# Patient Record
Sex: FEMALE | Race: BLACK OR AFRICAN AMERICAN | NOT HISPANIC OR LATINO | URBAN - METROPOLITAN AREA
[De-identification: names, ages, dates, MRNs, and addresses within clinical notes are randomized per-mention and may not be internally consistent; named-entity substitution may affect disease eponyms.]

---

## 2017-01-21 ENCOUNTER — EMERGENCY (EMERGENCY)
Facility: HOSPITAL | Age: 22
LOS: 0 days | Discharge: HOME | End: 2017-01-21
Admitting: EMERGENCY MEDICINE
Payer: MEDICAID
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
## 2017-01-21 DIAGNOSIS — R51 HEADACHE: ICD-10-CM

## 2017-01-21 DIAGNOSIS — M54.2 CERVICALGIA: ICD-10-CM

## 2017-01-21 DIAGNOSIS — S16.1XXA STRAIN OF MUSCLE, FASCIA AND TENDON AT NECK LEVEL, INITIAL ENCOUNTER: ICD-10-CM

## 2017-01-21 DIAGNOSIS — S19.80XA OTHER SPECIFIED INJURIES OF UNSPECIFIED PART OF NECK, INITIAL ENCOUNTER: ICD-10-CM
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
## 2017-01-21 DIAGNOSIS — S19.89XA OTHER SPECIFIED INJURIES OF OTHER SPECIFIED PART OF NECK, INITIAL ENCOUNTER: ICD-10-CM

## 2017-01-21 PROCEDURE — 99285 EMERGENCY DEPT VISIT HI MDM: CPT

## 2017-01-21 PROCEDURE — 73110 X-RAY EXAM OF WRIST: CPT | Mod: 26,LT

## 2017-01-21 PROCEDURE — 70450 CT HEAD/BRAIN W/O DYE: CPT | Mod: 26

## 2017-01-21 PROCEDURE — 72125 CT NECK SPINE W/O DYE: CPT | Mod: 26

## 2017-09-12 ENCOUNTER — EMERGENCY (EMERGENCY)
Facility: HOSPITAL | Age: 22
LOS: 0 days | Discharge: HOME | End: 2017-09-12
Attending: EMERGENCY MEDICINE | Admitting: EMERGENCY MEDICINE
Payer: MEDICAID

## 2017-09-12 VITALS
RESPIRATION RATE: 16 BRPM | SYSTOLIC BLOOD PRESSURE: 110 MMHG | HEART RATE: 66 BPM | DIASTOLIC BLOOD PRESSURE: 69 MMHG | OXYGEN SATURATION: 100 % | TEMPERATURE: 98 F

## 2017-09-12 VITALS — HEIGHT: 63 IN | WEIGHT: 143.08 LBS

## 2017-09-12 DIAGNOSIS — R10.9 UNSPECIFIED ABDOMINAL PAIN: ICD-10-CM

## 2017-09-12 DIAGNOSIS — O26.91 PREGNANCY RELATED CONDITIONS, UNSPECIFIED, FIRST TRIMESTER: ICD-10-CM

## 2017-09-12 LAB
ANION GAP SERPL CALC-SCNC: 6 MMOL/L — SIGNIFICANT CHANGE UP (ref 5–17)
APPEARANCE UR: CLEAR — SIGNIFICANT CHANGE UP
APTT BLD: 32.5 SEC — SIGNIFICANT CHANGE UP (ref 27.5–37.4)
BACTERIA # UR AUTO: (no result)
BASOPHILS # BLD AUTO: 0.2 K/UL — SIGNIFICANT CHANGE UP (ref 0–0.2)
BASOPHILS NFR BLD AUTO: 1 % — SIGNIFICANT CHANGE UP (ref 0–2)
BILIRUB UR-MCNC: NEGATIVE — SIGNIFICANT CHANGE UP
BLD GP AB SCN SERPL QL: SIGNIFICANT CHANGE UP
BUN SERPL-MCNC: 14 MG/DL — SIGNIFICANT CHANGE UP (ref 7–23)
CALCIUM SERPL-MCNC: 8.8 MG/DL — SIGNIFICANT CHANGE UP (ref 8.5–10.1)
CHLORIDE SERPL-SCNC: 105 MMOL/L — SIGNIFICANT CHANGE UP (ref 96–108)
CO2 SERPL-SCNC: 27 MMOL/L — SIGNIFICANT CHANGE UP (ref 22–31)
COLOR SPEC: YELLOW — SIGNIFICANT CHANGE UP
CREAT SERPL-MCNC: 0.63 MG/DL — SIGNIFICANT CHANGE UP (ref 0.5–1.3)
DIFF PNL FLD: (no result)
EOSINOPHIL # BLD AUTO: 0.2 K/UL — SIGNIFICANT CHANGE UP (ref 0–0.5)
EOSINOPHIL NFR BLD AUTO: 1.3 % — SIGNIFICANT CHANGE UP (ref 0–6)
EPI CELLS # UR: SIGNIFICANT CHANGE UP
GLUCOSE SERPL-MCNC: 87 MG/DL — SIGNIFICANT CHANGE UP (ref 70–99)
GLUCOSE UR QL: NEGATIVE MG/DL — SIGNIFICANT CHANGE UP
HCG SERPL-ACNC: 1387 MIU/ML — SIGNIFICANT CHANGE UP
HCT VFR BLD CALC: 35.3 % — SIGNIFICANT CHANGE UP (ref 34.5–45)
HGB BLD-MCNC: 11.9 G/DL — SIGNIFICANT CHANGE UP (ref 11.5–15.5)
HIV 1 & 2 AB SERPL IA.RAPID: SIGNIFICANT CHANGE UP
INR BLD: 1.06 RATIO — SIGNIFICANT CHANGE UP (ref 0.88–1.16)
KETONES UR-MCNC: NEGATIVE — SIGNIFICANT CHANGE UP
LEUKOCYTE ESTERASE UR-ACNC: NEGATIVE — SIGNIFICANT CHANGE UP
LYMPHOCYTES # BLD AUTO: 21.4 % — SIGNIFICANT CHANGE UP (ref 13–44)
LYMPHOCYTES # BLD AUTO: 3.7 K/UL — HIGH (ref 1–3.3)
MCHC RBC-ENTMCNC: 25.6 PG — LOW (ref 27–34)
MCHC RBC-ENTMCNC: 33.6 GM/DL — SIGNIFICANT CHANGE UP (ref 32–36)
MCV RBC AUTO: 76.2 FL — LOW (ref 80–100)
MONOCYTES # BLD AUTO: 0.9 K/UL — SIGNIFICANT CHANGE UP (ref 0–0.9)
MONOCYTES NFR BLD AUTO: 5.4 % — SIGNIFICANT CHANGE UP (ref 2–14)
NEUTROPHILS # BLD AUTO: 12.1 K/UL — HIGH (ref 1.8–7.4)
NEUTROPHILS NFR BLD AUTO: 70.9 % — SIGNIFICANT CHANGE UP (ref 43–77)
NITRITE UR-MCNC: NEGATIVE — SIGNIFICANT CHANGE UP
PH UR: 6 — SIGNIFICANT CHANGE UP (ref 5–8)
PLATELET # BLD AUTO: 288 K/UL — SIGNIFICANT CHANGE UP (ref 150–400)
POTASSIUM SERPL-MCNC: 3.7 MMOL/L — SIGNIFICANT CHANGE UP (ref 3.5–5.3)
POTASSIUM SERPL-SCNC: 3.7 MMOL/L — SIGNIFICANT CHANGE UP (ref 3.5–5.3)
PROT UR-MCNC: NEGATIVE MG/DL — SIGNIFICANT CHANGE UP
PROTHROM AB SERPL-ACNC: 11.5 SEC — SIGNIFICANT CHANGE UP (ref 9.8–12.7)
RBC # BLD: 4.64 M/UL — SIGNIFICANT CHANGE UP (ref 3.8–5.2)
RBC # FLD: 17.7 % — HIGH (ref 10.3–14.5)
RBC CASTS # UR COMP ASSIST: SIGNIFICANT CHANGE UP /HPF (ref 0–4)
SODIUM SERPL-SCNC: 138 MMOL/L — SIGNIFICANT CHANGE UP (ref 135–145)
SP GR SPEC: 1.02 — SIGNIFICANT CHANGE UP (ref 1.01–1.02)
TYPE + AB SCN PNL BLD: SIGNIFICANT CHANGE UP
UROBILINOGEN FLD QL: NEGATIVE MG/DL — SIGNIFICANT CHANGE UP
WBC # BLD: 17.1 K/UL — HIGH (ref 3.8–10.5)
WBC # FLD AUTO: 17.1 K/UL — HIGH (ref 3.8–10.5)
WBC UR QL: SIGNIFICANT CHANGE UP

## 2017-09-12 PROCEDURE — 76830 TRANSVAGINAL US NON-OB: CPT | Mod: 26

## 2017-09-12 PROCEDURE — 99284 EMERGENCY DEPT VISIT MOD MDM: CPT

## 2017-09-12 RX ORDER — SODIUM CHLORIDE 9 MG/ML
1000 INJECTION INTRAMUSCULAR; INTRAVENOUS; SUBCUTANEOUS ONCE
Qty: 0 | Refills: 0 | Status: COMPLETED | OUTPATIENT
Start: 2017-09-12 | End: 2017-09-12

## 2017-09-12 RX ADMIN — SODIUM CHLORIDE 1000 MILLILITER(S): 9 INJECTION INTRAMUSCULAR; INTRAVENOUS; SUBCUTANEOUS at 21:15

## 2017-09-12 NOTE — ED ADULT NURSE NOTE - CHIEF COMPLAINT QUOTE
cramping with ABD pain pt states her LMP 8/7/17 and believes she may be pregnant, took a home test that show + pregnancy

## 2017-09-12 NOTE — ED STATDOCS - OBJECTIVE STATEMENT
22 y/o pregnant female with no PMHx presents to the ED c/o abd pain. Pt states that she has been cramping.  LNMP on .  A1.

## 2017-09-12 NOTE — ED STATDOCS - ATTENDING CONTRIBUTION TO CARE
Attending Contribution to Care: I, Tabitha Treviño, performed the initial face to face bedside interview with this patient regarding history of present illness, review of symptoms and relevant past medical, social and family history.  I completed an independent physical examination.  I was the initial provider who evaluated this patient and the history, physical, and MDM reflect this intial assessment. I have signed out the follow up of any pending tests after the original (i.e. labs, radiological studies) to the ACP with instructions to review any with instructions to review any concerning findings to me prior to discharge.  I have communicated the patient’s plan of care and disposition with the ACP.

## 2017-09-12 NOTE — ED ADULT NURSE NOTE - OBJECTIVE STATEMENT
cramping with ABD pain pt states her LMP 8/7/17 and believes she may be pregnant, took a home test that show + pregnancy  pt denies vaginal bleeding

## 2018-01-11 ENCOUNTER — EMERGENCY (EMERGENCY)
Facility: HOSPITAL | Age: 23
LOS: 1 days | Discharge: HOME | End: 2018-01-11
Attending: EMERGENCY MEDICINE | Admitting: EMERGENCY MEDICINE
Payer: MEDICAID

## 2018-01-11 VITALS
SYSTOLIC BLOOD PRESSURE: 108 MMHG | RESPIRATION RATE: 15 BRPM | HEART RATE: 71 BPM | OXYGEN SATURATION: 99 % | TEMPERATURE: 98 F | DIASTOLIC BLOOD PRESSURE: 70 MMHG

## 2018-01-11 VITALS
SYSTOLIC BLOOD PRESSURE: 126 MMHG | HEART RATE: 81 BPM | RESPIRATION RATE: 16 BRPM | HEIGHT: 61 IN | TEMPERATURE: 98 F | WEIGHT: 145.95 LBS | OXYGEN SATURATION: 100 % | DIASTOLIC BLOOD PRESSURE: 77 MMHG

## 2018-01-11 DIAGNOSIS — Z98.891 HISTORY OF UTERINE SCAR FROM PREVIOUS SURGERY: Chronic | ICD-10-CM

## 2018-01-11 PROCEDURE — 99283 EMERGENCY DEPT VISIT LOW MDM: CPT

## 2018-01-11 PROCEDURE — 73110 X-RAY EXAM OF WRIST: CPT

## 2018-01-11 PROCEDURE — 73130 X-RAY EXAM OF HAND: CPT | Mod: 26,RT

## 2018-01-11 PROCEDURE — 73110 X-RAY EXAM OF WRIST: CPT | Mod: 26,RT

## 2018-01-11 PROCEDURE — 73130 X-RAY EXAM OF HAND: CPT

## 2018-01-11 PROCEDURE — 99284 EMERGENCY DEPT VISIT MOD MDM: CPT | Mod: 25

## 2018-01-11 RX ORDER — ACETAMINOPHEN 500 MG
650 TABLET ORAL ONCE
Qty: 0 | Refills: 0 | Status: COMPLETED | OUTPATIENT
Start: 2018-01-11 | End: 2018-01-11

## 2018-01-11 RX ADMIN — Medication 650 MILLIGRAM(S): at 17:17

## 2018-01-11 RX ADMIN — Medication 650 MILLIGRAM(S): at 18:44

## 2018-01-11 NOTE — ED ADULT NURSE NOTE - OBJECTIVE STATEMENT
22 yr old female c/o right hand and wrist pain s/p fall at work today. Pt 3 and 4 nails on right hand broke; minimal bleeding at site. Pt states she fell walking up the stairs. Pt states she is 23 weeks pregnant. Pt states she put her hands down to break fall and did not hit head or abdomen. Pt denies vision changes, SOB, chest pain, spotting, abdominal pain.

## 2018-01-11 NOTE — ED PROVIDER NOTE - SKIN, MLM
Skin normal color for race, warm, dry. Partially avulsed 3rd and 4th right finger nails, no active bleeding.

## 2018-01-11 NOTE — ED PROVIDER NOTE - OBJECTIVE STATEMENT
23 y/o F pt w/ no significant PMHx presents to the ED c/o right hand pain/injury s/p mechanical fall today. Pt states she tripped and fell outside of work, used her hands to try to stop her fall... states her right wrist and right 3rd and 4th fingers hyperextended upon impact. Pt also states she is 23 weeks pregnant. Pt denies numbness, tingling, LOC or any other complaints at this time.

## 2018-01-11 NOTE — ED PROVIDER NOTE - MEDICAL DECISION MAKING DETAILS
evaluate the severity of finger injury corollate with Physical exam and imaging study treat accordingly.

## 2018-01-11 NOTE — ED ADULT NURSE NOTE - CHPI ED SYMPTOMS NEG
no vomiting/no abrasion/no confusion/no loss of consciousness/no fever/no tingling/no numbness/no weakness

## 2018-01-11 NOTE — ED ADULT NURSE REASSESSMENT NOTE - NS ED NURSE REASSESS COMMENT FT1
Pt states when she fell she slightly hit abdomen on stair but very light ly; Dr Valentino aware. Doppler used to locate fetal heartbeat; fetal heartrate 160BPM. Pt states sensation of baby movement. No spotting or discharge noted.

## 2018-10-22 ENCOUNTER — TRANSCRIPTION ENCOUNTER (OUTPATIENT)
Age: 23
End: 2018-10-22

## 2018-11-20 ENCOUNTER — TRANSCRIPTION ENCOUNTER (OUTPATIENT)
Age: 23
End: 2018-11-20

## 2018-11-29 ENCOUNTER — EMERGENCY (EMERGENCY)
Facility: HOSPITAL | Age: 23
LOS: 0 days | Discharge: HOME | End: 2018-11-29
Attending: EMERGENCY MEDICINE
Payer: MEDICAID

## 2018-11-29 VITALS
RESPIRATION RATE: 18 BRPM | OXYGEN SATURATION: 100 % | SYSTOLIC BLOOD PRESSURE: 123 MMHG | TEMPERATURE: 98 F | DIASTOLIC BLOOD PRESSURE: 71 MMHG | HEART RATE: 79 BPM

## 2018-11-29 VITALS — WEIGHT: 143.08 LBS

## 2018-11-29 DIAGNOSIS — Z3A.19 19 WEEKS GESTATION OF PREGNANCY: ICD-10-CM

## 2018-11-29 DIAGNOSIS — O26.892 OTHER SPECIFIED PREGNANCY RELATED CONDITIONS, SECOND TRIMESTER: ICD-10-CM

## 2018-11-29 DIAGNOSIS — Z98.891 HISTORY OF UTERINE SCAR FROM PREVIOUS SURGERY: Chronic | ICD-10-CM

## 2018-11-29 DIAGNOSIS — R11.10 VOMITING, UNSPECIFIED: ICD-10-CM

## 2018-11-29 DIAGNOSIS — R55 SYNCOPE AND COLLAPSE: ICD-10-CM

## 2018-11-29 LAB
ALBUMIN SERPL ELPH-MCNC: 2.8 G/DL — LOW (ref 3.3–5)
ALP SERPL-CCNC: 65 U/L — SIGNIFICANT CHANGE UP (ref 40–120)
ALT FLD-CCNC: 16 U/L — SIGNIFICANT CHANGE UP (ref 12–78)
ANION GAP SERPL CALC-SCNC: 11 MMOL/L — SIGNIFICANT CHANGE UP (ref 5–17)
APPEARANCE UR: CLEAR — SIGNIFICANT CHANGE UP
APTT BLD: 28.1 SEC — SIGNIFICANT CHANGE UP (ref 27.5–36.3)
AST SERPL-CCNC: 14 U/L — LOW (ref 15–37)
BASOPHILS # BLD AUTO: 0.03 K/UL — SIGNIFICANT CHANGE UP (ref 0–0.2)
BASOPHILS NFR BLD AUTO: 0.3 % — SIGNIFICANT CHANGE UP (ref 0–2)
BILIRUB SERPL-MCNC: 0.1 MG/DL — LOW (ref 0.2–1.2)
BILIRUB UR-MCNC: NEGATIVE — SIGNIFICANT CHANGE UP
BUN SERPL-MCNC: 7 MG/DL — SIGNIFICANT CHANGE UP (ref 7–23)
CALCIUM SERPL-MCNC: 8.7 MG/DL — SIGNIFICANT CHANGE UP (ref 8.5–10.1)
CHLORIDE SERPL-SCNC: 105 MMOL/L — SIGNIFICANT CHANGE UP (ref 96–108)
CO2 SERPL-SCNC: 23 MMOL/L — SIGNIFICANT CHANGE UP (ref 22–31)
COLOR SPEC: YELLOW — SIGNIFICANT CHANGE UP
CREAT SERPL-MCNC: 0.48 MG/DL — LOW (ref 0.5–1.3)
DIFF PNL FLD: NEGATIVE — SIGNIFICANT CHANGE UP
EOSINOPHIL # BLD AUTO: 0.1 K/UL — SIGNIFICANT CHANGE UP (ref 0–0.5)
EOSINOPHIL NFR BLD AUTO: 1 % — SIGNIFICANT CHANGE UP (ref 0–6)
GLUCOSE SERPL-MCNC: 74 MG/DL — SIGNIFICANT CHANGE UP (ref 70–99)
GLUCOSE UR QL: NEGATIVE MG/DL — SIGNIFICANT CHANGE UP
HCG SERPL-ACNC: HIGH MIU/ML
HCT VFR BLD CALC: 30.4 % — LOW (ref 34.5–45)
HGB BLD-MCNC: 9.5 G/DL — LOW (ref 11.5–15.5)
IMM GRANULOCYTES NFR BLD AUTO: 0.3 % — SIGNIFICANT CHANGE UP (ref 0–1.5)
INR BLD: 1.02 RATIO — SIGNIFICANT CHANGE UP (ref 0.88–1.16)
KETONES UR-MCNC: ABNORMAL
LEUKOCYTE ESTERASE UR-ACNC: ABNORMAL
LYMPHOCYTES # BLD AUTO: 2.22 K/UL — SIGNIFICANT CHANGE UP (ref 1–3.3)
LYMPHOCYTES # BLD AUTO: 22.7 % — SIGNIFICANT CHANGE UP (ref 13–44)
MCHC RBC-ENTMCNC: 23.3 PG — LOW (ref 27–34)
MCHC RBC-ENTMCNC: 31.3 GM/DL — LOW (ref 32–36)
MCV RBC AUTO: 74.5 FL — LOW (ref 80–100)
MONOCYTES # BLD AUTO: 0.61 K/UL — SIGNIFICANT CHANGE UP (ref 0–0.9)
MONOCYTES NFR BLD AUTO: 6.2 % — SIGNIFICANT CHANGE UP (ref 2–14)
NEUTROPHILS # BLD AUTO: 6.79 K/UL — SIGNIFICANT CHANGE UP (ref 1.8–7.4)
NEUTROPHILS NFR BLD AUTO: 69.5 % — SIGNIFICANT CHANGE UP (ref 43–77)
NITRITE UR-MCNC: NEGATIVE — SIGNIFICANT CHANGE UP
PH UR: 7 — SIGNIFICANT CHANGE UP (ref 5–8)
PLATELET # BLD AUTO: 288 K/UL — SIGNIFICANT CHANGE UP (ref 150–400)
POTASSIUM SERPL-MCNC: 3.5 MMOL/L — SIGNIFICANT CHANGE UP (ref 3.5–5.3)
POTASSIUM SERPL-SCNC: 3.5 MMOL/L — SIGNIFICANT CHANGE UP (ref 3.5–5.3)
PROT SERPL-MCNC: 6.8 GM/DL — SIGNIFICANT CHANGE UP (ref 6–8.3)
PROT UR-MCNC: NEGATIVE MG/DL — SIGNIFICANT CHANGE UP
PROTHROM AB SERPL-ACNC: 11.3 SEC — SIGNIFICANT CHANGE UP (ref 10–12.9)
RBC # BLD: 4.08 M/UL — SIGNIFICANT CHANGE UP (ref 3.8–5.2)
RBC # FLD: 20.4 % — HIGH (ref 10.3–14.5)
SODIUM SERPL-SCNC: 139 MMOL/L — SIGNIFICANT CHANGE UP (ref 135–145)
SP GR SPEC: 1 — LOW (ref 1.01–1.02)
TROPONIN I SERPL-MCNC: <0.015 NG/ML — SIGNIFICANT CHANGE UP (ref 0.01–0.04)
TYPE + AB SCN PNL BLD: SIGNIFICANT CHANGE UP
UROBILINOGEN FLD QL: NEGATIVE MG/DL — SIGNIFICANT CHANGE UP
WBC # BLD: 9.78 K/UL — SIGNIFICANT CHANGE UP (ref 3.8–10.5)
WBC # FLD AUTO: 9.78 K/UL — SIGNIFICANT CHANGE UP (ref 3.8–10.5)

## 2018-11-29 PROCEDURE — 93010 ELECTROCARDIOGRAM REPORT: CPT

## 2018-11-29 PROCEDURE — 76805 OB US >/= 14 WKS SNGL FETUS: CPT | Mod: 26

## 2018-11-29 PROCEDURE — 99285 EMERGENCY DEPT VISIT HI MDM: CPT

## 2018-11-29 PROCEDURE — 99283 EMERGENCY DEPT VISIT LOW MDM: CPT

## 2018-11-29 RX ORDER — SODIUM CHLORIDE 9 MG/ML
1000 INJECTION INTRAMUSCULAR; INTRAVENOUS; SUBCUTANEOUS ONCE
Qty: 0 | Refills: 0 | Status: COMPLETED | OUTPATIENT
Start: 2018-11-29 | End: 2018-11-29

## 2018-11-29 RX ADMIN — SODIUM CHLORIDE 1000 MILLILITER(S): 9 INJECTION INTRAMUSCULAR; INTRAVENOUS; SUBCUTANEOUS at 16:05

## 2018-11-29 RX ADMIN — SODIUM CHLORIDE 1000 MILLILITER(S): 9 INJECTION INTRAMUSCULAR; INTRAVENOUS; SUBCUTANEOUS at 17:05

## 2018-11-29 NOTE — ED STATDOCS - OBJECTIVE STATEMENT
22 y/o female 16 weeks pregnant with no significant PMHx presents to the ED s/p syncope. Pt notes she was vomiting over the toilet at school and does not remember anything after that. Unknown if pt hit her head. +HA. Denies vaginal bleeding, discharge. LNMP: August 2018. NKDA. No EtOH use. Former smoker.

## 2018-11-29 NOTE — ED ADULT NURSE NOTE - OBJECTIVE STATEMENT
C/O syncope episode today while at school. Patient reports she was vomiting when passed out. Does not remember anything else. Per patient woke up sitting on the floor. Reports nausea, dizziness, headache. 3rd pregnancy, 16 weeks into pregnancy. Denies med hx.

## 2018-11-29 NOTE — ED ADULT NURSE NOTE - NSIMPLEMENTINTERV_GEN_ALL_ED
Implemented All Fall with Harm Risk Interventions:  [place] to call system. Call bell, personal items and telephone within reach. Instruct patient to call for assistance. Room bathroom lighting operational. Non-slip footwear when patient is off stretcher. Physically safe environment: no spills, clutter or unnecessary equipment. Stretcher in lowest position, wheels locked, appropriate side rails in place. Provide visual cue, wrist band, yellow gown, etc. Monitor gait and stability. Monitor for mental status changes and reorient to person, place, and time. Review medications for side effects contributing to fall risk. Reinforce activity limits and safety measures with patient and family. Provide visual clues: red socks.

## 2018-11-29 NOTE — ED STATDOCS - MEDICAL DECISION MAKING DETAILS
presents with syncopal episode while vomiting at school. Pt had 2 miscarriages. Plan: cardiac monitor, labs, hydration and sonogram as she has not had a sonogram yet.

## 2018-11-29 NOTE — CONSULT NOTE ADULT - ASSESSMENT
Patient is a 23y old  @ 19w3d +/- 7days per sono in ED presented after syncopal episode while vomiting in school.  - Patient reported normal FM, no vaginal bleeding or fluid loss  - will need cardio and neuro workup for syncopal episode  - discussed with patient regarding options for pregnancy termination and limited options due to her being about 20 weeks pregnant. Patient understands that if she chooses to proceed with pregnancy, she will need continuous prenatal care. Patient stated that she will probably go back to [facility] where her midwife is.  - all other management and plan per ED  - discussed with ER PA, and patient regarding plan, verbalized understanding.

## 2018-11-29 NOTE — ED ADULT TRIAGE NOTE - CHIEF COMPLAINT QUOTE
pt presents to ED approx 20 weeks pregnant with her 3rd pregnancy pt had a syncopal episode while vomiting approx 1 hour ago

## 2018-11-29 NOTE — CONSULT NOTE ADULT - SUBJECTIVE AND OBJECTIVE BOX
FELICIA ZHOU  23y  Female [id number]    Patient is a 23y old  @ 19w3d +/- 7days per sono in ED presented after syncopal episode while vomiting in school. Patient stated that she felt nauseous after eating a turkey sandwich today. Patient reports hx of anxiety and stated that she has not taken any medications for the last 2 years, but reports hx of syncopal episodes when she has anxiety attacks. She also reports that she has hx of chronic anemia, was never told the reason, but reports family hx of sickle cell anemia. Patient currently not receiving any prenatal care, [facility] since she found out she is pregnant, is not taking any prenatal vitamins. Patient also stated that she is considering terminating the pregnancy, but has not yet found any specific places for termination after 20 weeks. Patient was told by friends and family that she can go somewhere in [place] if she chooses to terminate. Patient reported 2 previous c/s at full term due to failure to descend (last c/s was 6 months ago). Currently feeling better.     REVIEW OF SYSTEMS:  CONSTITUTIONAL: No weakness, fevers or chills  RESPIRATORY: No cough, wheezing, hemoptysis; No shortness of breath  CARDIOVASCULAR: No chest pain or palpitations  GASTROINTESTINAL: reported vomiting, No abdominal or epigastric pain. No nausea, No diarrhea o  GENITOURINARY: No dysuria, frequency or hematuria  NEUROLOGICAL: reported dizziness which improved, No numbness or weakness      MEDICATIONS  (STANDING):    MEDICATIONS  (PRN):      Allergies    No Known Drug Allergies  Originally Entered as [Unknown] reaction to [Bee/Wasp Stings] (Unknown)    Intolerances        PAST MEDICAL & SURGICAL HISTORY:  chronic anemia  anxiety   H/O:       OB/GYN HISTORY:    LMP: late august   (currently no prenatal care)   2x c/s at full term for failure to descend                                             FAMILY HISTORY:  sickle cell     SOCIAL HISTORY:    Vital Signs Last 24 Hrs  T(C): 36.6 (2018 15:14), Max: 36.6 (2018 15:14)  T(F): 97.9 (2018 15:14), Max: 97.9 (2018 15:14)  HR: 79 (2018 15:14) (79 - 79)  BP: 123/71 (2018 15:14) (123/71 - 123/71)  BP(mean): --  RR: 18 (2018 15:14) (18 - 18)  SpO2: 100% (2018 15:14) (100% - 100%)      PHYSICAL EXAM:  Constitutional: NAD, awake and alert, appears comfortable  Respiratory: Breath sounds are clear bilaterally, No wheezing, rales or rhonchi  Cardiovascular: S1 and S2, regular rate and rhythm, no Murmurs, gallops or rubs  Gastrointestinal: Bowel Sounds present, soft, nontender, nondistended, no guarding, no rebound  Extremities: No peripheral edema  Neurological: A/O x 3, no focal deficits      LABS:  Pregnancy Test: positive                        9.5    9.78  )-----------( 288      ( 2018 16:02 )             30.4         139  |  105  |  7   ----------------------------<  74  3.5   |  23  |  0.48<L>    Ca    8.7      2018 16:02    TPro  6.8  /  Alb  2.8<L>  /  TBili  0.1<L>  /  DBili  x   /  AST  14<L>  /  ALT  16  /  AlkPhos  65      I&O's Detail    PT/INR - ( 2018 16:02 )   PT: 11.3 sec;   INR: 1.02 ratio         PTT - ( 2018 16:02 )  PTT:28.1 sec  Urinalysis Basic - ( 2018 16:02 )    Color: Yellow / Appearance: Clear / S.005 / pH: x  Gluc: x / Ketone: Trace  / Bili: Negative / Urobili: Negative mg/dL   Blood: x / Protein: Negative mg/dL / Nitrite: Negative   Leuk Esterase: Trace / RBC: 0-5 /HPF / WBC 3-5   Sq Epi: x / Non Sq Epi: Few / Bacteria: Occasional        RADIOLOGY & ADDITIONAL STUDIES:  < from: US Echo OB >=14 Weeks, First Gestation (18 @ 16:21) >  EXAM:  US OB GRT THAN 14 WKS 1ST GEST                            PROCEDURE DATE:  2018          INTERPRETATION:  US OB GRT THAN 14 WKS 1ST GEST    HISTORY: 20 weeks pregnant, syncope. Pregnancy evaluation.    Transabdominal pelvic ultrasound:    A single live Intrauterine gestation is present in cephalic presentation.   The placenta is posterior and fundal without previa. Amniotic fluid   volume is within normal limits with the JEREMY measuring 15.7 cm with the   largest fluid pocket measuring 5 cm.     Fetal motion is seen in real-time and the fetal heart rate measures 142   bpm.    Evaluation of fetal anatomy is limited on this examination. The fetal   stomach and urinary bladder are normally distended. The fetal kidneys are   normal in size and position without hydronephrosis.     Measurements are as follows:    BPD: 4.59 cm corresponding to 19 weeks 6 days.  HC: 15.8 cm corresponding to 18 weeks 5 days.  AC: 14.97 cm corresponding to 20 weeks 2 days.  FL: 3.15 cm corresponding to19 weeks 6 days.    Estimated fetal weight: 318 g +/- 48 g. ( 0 lb 11 oz +/- 2 oz)    Estimated fetal weight percent: 97%    IMPRESSION:  19 weeks and 3 days +/- 7 days by this ultrasound corresponding to an SERG   of 2019.    FHR = 142 bpm    JEREMY = 15.7 cm    BRITT GAING   This document has been electronically signed. 2018  4:52PM

## 2018-11-29 NOTE — ED STATDOCS - ATTENDING CONTRIBUTION TO CARE
I, Dorita Martinez MD, personally saw the patient with ACP.  I have personally performed a face to face diagnostic evaluation on this patient.  I have reviewed the ACP note and agree with the history, exam, and plan of care, except as noted.

## 2018-11-29 NOTE — CONSULT NOTE ADULT - ATTENDING COMMENTS
24yo  @ 19w3d presented to ER  for syncopal episode, patient had turkey sandwich, vomiting x1, anxiety, palpitations.  h/o anxiety, Depression, used Topamax and zoloft in the past   2 previous  c-sections  feeling better now, positive fetal movements, no leakage of fluid, no vaginal bleeding  VS- wnl  A/P- Gravid, non tender, FHR documented by US  No vaginal bleeding  start prenatal vitamin.  Medical management per ER attending.  follow up with cardiology and Neurology for complete evaluation of syncopal episode. patient informed, verbalised understanding  ER PA Ms Bragg informed of the plan

## 2018-11-29 NOTE — ED STATDOCS - PROGRESS NOTE DETAILS
Patient seen and evaluated with ED attending at intake.  Will follow up on orders and continue to monitor patient -Deacon Bragg PA-C Reviewed lab and sono results with patient.  She admits to chronic anemia and that she has not yet been on prenatal vitamins.  Case was d/w OB who will be down to evaluate -Deacon Bragg PA-C OB Dr. Bender saw and evaluated patient.  She recommends patient establish care at an OB and have close  follow up as well as outpatient appointments with both neurology and cardiology for the syncopal episode.  Will start patient on prenatal vitamins.  Strict return precautions reviewed -Deacon Bragg PA-C pt feeling better upon discharge.  read back discharge instructions

## 2018-11-30 LAB
CULTURE RESULTS: SIGNIFICANT CHANGE UP
SPECIMEN SOURCE: SIGNIFICANT CHANGE UP

## 2019-01-04 ENCOUNTER — OUTPATIENT (OUTPATIENT)
Dept: INPATIENT UNIT | Facility: HOSPITAL | Age: 24
LOS: 1 days | Discharge: HOME | End: 2019-01-04
Payer: MEDICAID

## 2019-01-04 DIAGNOSIS — Z98.891 HISTORY OF UTERINE SCAR FROM PREVIOUS SURGERY: Chronic | ICD-10-CM

## 2019-01-04 DIAGNOSIS — O47.9 FALSE LABOR, UNSPECIFIED: ICD-10-CM

## 2019-01-04 LAB
ALBUMIN SERPL ELPH-MCNC: 2.7 G/DL — LOW (ref 3.3–5)
ALP SERPL-CCNC: 76 U/L — SIGNIFICANT CHANGE UP (ref 40–120)
ALT FLD-CCNC: 14 U/L — SIGNIFICANT CHANGE UP (ref 12–78)
AMNISURE ROM (RUPTURE OF MEMBRANES): NEGATIVE — SIGNIFICANT CHANGE UP
ANION GAP SERPL CALC-SCNC: 8 MMOL/L — SIGNIFICANT CHANGE UP (ref 5–17)
AST SERPL-CCNC: 18 U/L — SIGNIFICANT CHANGE UP (ref 15–37)
BASOPHILS # BLD AUTO: 0.03 K/UL — SIGNIFICANT CHANGE UP (ref 0–0.2)
BASOPHILS NFR BLD AUTO: 0.2 % — SIGNIFICANT CHANGE UP (ref 0–2)
BILIRUB SERPL-MCNC: 0.2 MG/DL — SIGNIFICANT CHANGE UP (ref 0.2–1.2)
BUN SERPL-MCNC: 9 MG/DL — SIGNIFICANT CHANGE UP (ref 7–23)
CALCIUM SERPL-MCNC: 9 MG/DL — SIGNIFICANT CHANGE UP (ref 8.5–10.1)
CHLORIDE SERPL-SCNC: 107 MMOL/L — SIGNIFICANT CHANGE UP (ref 96–108)
CO2 SERPL-SCNC: 24 MMOL/L — SIGNIFICANT CHANGE UP (ref 22–31)
CREAT SERPL-MCNC: 0.45 MG/DL — LOW (ref 0.5–1.3)
EOSINOPHIL # BLD AUTO: 0.01 K/UL — SIGNIFICANT CHANGE UP (ref 0–0.5)
EOSINOPHIL NFR BLD AUTO: 0.1 % — SIGNIFICANT CHANGE UP (ref 0–6)
GLUCOSE SERPL-MCNC: 74 MG/DL — SIGNIFICANT CHANGE UP (ref 70–99)
HCT VFR BLD CALC: 30.6 % — LOW (ref 34.5–45)
HGB BLD-MCNC: 9.7 G/DL — LOW (ref 11.5–15.5)
IMM GRANULOCYTES NFR BLD AUTO: 0.4 % — SIGNIFICANT CHANGE UP (ref 0–1.5)
LYMPHOCYTES # BLD AUTO: 1.31 K/UL — SIGNIFICANT CHANGE UP (ref 1–3.3)
LYMPHOCYTES # BLD AUTO: 9.1 % — LOW (ref 13–44)
MCHC RBC-ENTMCNC: 23.3 PG — LOW (ref 27–34)
MCHC RBC-ENTMCNC: 31.7 GM/DL — LOW (ref 32–36)
MCV RBC AUTO: 73.6 FL — LOW (ref 80–100)
MONOCYTES # BLD AUTO: 0.69 K/UL — SIGNIFICANT CHANGE UP (ref 0–0.9)
MONOCYTES NFR BLD AUTO: 4.8 % — SIGNIFICANT CHANGE UP (ref 2–14)
NEUTROPHILS # BLD AUTO: 12.35 K/UL — HIGH (ref 1.8–7.4)
NEUTROPHILS NFR BLD AUTO: 85.4 % — HIGH (ref 43–77)
PLATELET # BLD AUTO: 304 K/UL — SIGNIFICANT CHANGE UP (ref 150–400)
POTASSIUM SERPL-MCNC: 4.3 MMOL/L — SIGNIFICANT CHANGE UP (ref 3.5–5.3)
POTASSIUM SERPL-SCNC: 4.3 MMOL/L — SIGNIFICANT CHANGE UP (ref 3.5–5.3)
PROT SERPL-MCNC: 7.2 GM/DL — SIGNIFICANT CHANGE UP (ref 6–8.3)
RBC # BLD: 4.16 M/UL — SIGNIFICANT CHANGE UP (ref 3.8–5.2)
RBC # FLD: 18 % — HIGH (ref 10.3–14.5)
SODIUM SERPL-SCNC: 139 MMOL/L — SIGNIFICANT CHANGE UP (ref 135–145)
WBC # BLD: 14.45 K/UL — HIGH (ref 3.8–10.5)
WBC # FLD AUTO: 14.45 K/UL — HIGH (ref 3.8–10.5)

## 2019-01-04 PROCEDURE — 76815 OB US LIMITED FETUS(S): CPT | Mod: 26

## 2019-01-04 RX ORDER — SODIUM CHLORIDE 9 MG/ML
1000 INJECTION, SOLUTION INTRAVENOUS ONCE
Qty: 0 | Refills: 0 | Status: DISCONTINUED | OUTPATIENT
Start: 2019-01-04 | End: 2019-01-04

## 2019-01-05 LAB
C TRACH RRNA SPEC QL NAA+PROBE: SIGNIFICANT CHANGE UP
N GONORRHOEA RRNA SPEC QL NAA+PROBE: SIGNIFICANT CHANGE UP
SPECIMEN SOURCE: SIGNIFICANT CHANGE UP

## 2019-01-06 LAB
GROUP B BETA STREP DNA (PCR): DETECTED
GROUP B BETA STREP INTERPRETATION: SIGNIFICANT CHANGE UP
SOURCE GROUP B STREP: SIGNIFICANT CHANGE UP

## 2019-02-01 ENCOUNTER — OUTPATIENT (OUTPATIENT)
Dept: OUTPATIENT SERVICES | Facility: HOSPITAL | Age: 24
LOS: 1 days | End: 2019-02-01
Payer: MEDICAID

## 2019-02-01 DIAGNOSIS — Z98.891 HISTORY OF UTERINE SCAR FROM PREVIOUS SURGERY: Chronic | ICD-10-CM

## 2019-02-01 PROCEDURE — G9001: CPT

## 2019-02-15 ENCOUNTER — EMERGENCY (EMERGENCY)
Facility: HOSPITAL | Age: 24
LOS: 0 days | Discharge: HOME | End: 2019-02-15
Attending: FAMILY MEDICINE | Admitting: FAMILY MEDICINE
Payer: MEDICAID

## 2019-02-15 VITALS
TEMPERATURE: 99 F | DIASTOLIC BLOOD PRESSURE: 68 MMHG | OXYGEN SATURATION: 100 % | HEART RATE: 97 BPM | SYSTOLIC BLOOD PRESSURE: 101 MMHG | RESPIRATION RATE: 18 BRPM

## 2019-02-15 VITALS — WEIGHT: 138.01 LBS | HEIGHT: 61 IN

## 2019-02-15 DIAGNOSIS — Z98.891 HISTORY OF UTERINE SCAR FROM PREVIOUS SURGERY: Chronic | ICD-10-CM

## 2019-02-15 DIAGNOSIS — R07.9 CHEST PAIN, UNSPECIFIED: ICD-10-CM

## 2019-02-15 DIAGNOSIS — J18.9 PNEUMONIA, UNSPECIFIED ORGANISM: ICD-10-CM

## 2019-02-15 DIAGNOSIS — Z3A.30 30 WEEKS GESTATION OF PREGNANCY: ICD-10-CM

## 2019-02-15 DIAGNOSIS — O99.513 DISEASES OF THE RESPIRATORY SYSTEM COMPLICATING PREGNANCY, THIRD TRIMESTER: ICD-10-CM

## 2019-02-15 DIAGNOSIS — O26.893 OTHER SPECIFIED PREGNANCY RELATED CONDITIONS, THIRD TRIMESTER: ICD-10-CM

## 2019-02-15 DIAGNOSIS — R06.02 SHORTNESS OF BREATH: ICD-10-CM

## 2019-02-15 PROCEDURE — 93010 ELECTROCARDIOGRAM REPORT: CPT

## 2019-02-15 PROCEDURE — 99283 EMERGENCY DEPT VISIT LOW MDM: CPT

## 2019-02-15 RX ORDER — AZITHROMYCIN 500 MG/1
500 TABLET, FILM COATED ORAL ONCE
Qty: 0 | Refills: 0 | Status: COMPLETED | OUTPATIENT
Start: 2019-02-15 | End: 2019-02-15

## 2019-02-15 RX ORDER — AZITHROMYCIN 500 MG/1
1 TABLET, FILM COATED ORAL
Qty: 4 | Refills: 0
Start: 2019-02-15 | End: 2019-02-18

## 2019-02-15 RX ADMIN — AZITHROMYCIN 500 MILLIGRAM(S): 500 TABLET, FILM COATED ORAL at 23:11

## 2019-02-15 NOTE — ED STATDOCS - CLINICAL SUMMARY MEDICAL DECISION MAKING FREE TEXT BOX
Pt 30wks pregnant w/ SOB and Chest pain associated w/ cough.  Plan:  XR, pt refusing pain medication, may need neb tx.

## 2019-02-15 NOTE — ED STATDOCS - PROGRESS NOTE DETAILS
RACH Vera:   Patient has been seen, evaluated and orders have been written by the attending in intake. Patient is stable.  I will follow up the results of orders written and I will continue to evaluate/observe the patient.   Bela Vera PA-C On re-eval, pt comfortable sitting on stretcher in [employer].  Neg Tachypnea, tachycardia.  Dry cogh intermittently.  CTA B.  Better airway movement.  Neg wheezing, crackles.  RRR.  On CXR review, ? RLL infiltrate per Dr. jimenez.  Will start PO Zithromax.  Cont for 4 more days.  Can take Tylenol for pain and Robitussin for cough.  F/U with PMD.  Bela Vera PA-C
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
## 2019-02-15 NOTE — ED ADULT NURSE REASSESSMENT NOTE - NS ED NURSE REASSESS COMMENT FT1
patient discharged home. written and verbal discharge, prescription, and followup instructions given to patient, patient verbalized back understanding.

## 2019-02-15 NOTE — ED ADULT NURSE NOTE - CHIEF COMPLAINT QUOTE
Pt presents c/o cough, sob and back pain since yesterday. No abdominal pain. Pt has positive sick contact (son.) Pt is approx 30 weeks pregnant. Pt's doctor is Dr. Farley at [facility]. Pt has a scheduled c- section. Spoke to L&D RN, pt to go up after ED evaluation. EKG being performed at this time.

## 2019-02-15 NOTE — ED STATDOCS - OBJECTIVE STATEMENT
24y/o F w/ no significant PMHx presents to the ED w/ chest pain and SOB.  Pt c/o cough which causes pain.  Symptoms are associated with sinus pressure congestion and retroorbital pain/pressure.  Pt denies N/V/D fever or chills.  No sick contacts, no recent travel outside of the country. Pt denies Tylenol when offered. LMP end of July.  Pt is currently pregnant at 30 weeks, w/o complications.  Pt has schedule  at 39wks. NKDA.  OB: Dr. Farley ([place]). 24y/o F w/ no significant PMHx presents to the ED w/ chest pain and SOB.  Pt c/o cough which causes pain.  Symptoms are associated with sinus pressure congestion and retroorbital pain/pressure.  Pt denies N/V/D fever or chills.  No sick contacts, no recent travel outside of the country. Pt denies Tylenol when offered. LMP end of July.  Pt is currently pregnant at 30 weeks, w/o complications.  Pt has schedule  at 39wks. NKDA.  OB: Dr. Farley ([place]). Nonsmoker, nondrinker no drugs.

## 2019-02-15 NOTE — ED ADULT NURSE NOTE - NSIMPLEMENTINTERV_GEN_ALL_ED
Implemented All Universal Safety Interventions:  [place] to call system. Call bell, personal items and telephone within reach. Instruct patient to call for assistance. Room bathroom lighting operational. Non-slip footwear when patient is off stretcher. Physically safe environment: no spills, clutter or unnecessary equipment. Stretcher in lowest position, wheels locked, appropriate side rails in place.

## 2019-02-15 NOTE — ED STATDOCS - CARE PLAN
Principal Discharge DX:	Pneumonia Principal Discharge DX:	CAP (community acquired pneumonia)  Secondary Diagnosis:	Pregnant and not yet delivered, third trimester

## 2019-02-16 PROCEDURE — 71046 X-RAY EXAM CHEST 2 VIEWS: CPT | Mod: 26

## 2019-02-21 DIAGNOSIS — Z71.89 OTHER SPECIFIED COUNSELING: ICD-10-CM

## 2019-07-08 NOTE — ED STATDOCS - CPE ED RESP NORM
Patient returning call. Please call her this afternoon at home [phone], she is concerned she will run out of medications before she can get in for follow up.   normal...

## 2019-10-01 ENCOUNTER — EMERGENCY (EMERGENCY)
Facility: HOSPITAL | Age: 24
LOS: 1 days | Discharge: HOME | End: 2019-10-01
Attending: EMERGENCY MEDICINE
Payer: COMMERCIAL

## 2019-10-01 VITALS
HEIGHT: 61 IN | DIASTOLIC BLOOD PRESSURE: 84 MMHG | SYSTOLIC BLOOD PRESSURE: 146 MMHG | HEART RATE: 89 BPM | OXYGEN SATURATION: 99 % | WEIGHT: 275.58 LBS | TEMPERATURE: 98 F | RESPIRATION RATE: 20 BRPM

## 2019-10-01 DIAGNOSIS — Z98.891 HISTORY OF UTERINE SCAR FROM PREVIOUS SURGERY: Chronic | ICD-10-CM

## 2019-10-01 PROCEDURE — 73110 X-RAY EXAM OF WRIST: CPT | Mod: 26,RT

## 2019-10-01 PROCEDURE — 99283 EMERGENCY DEPT VISIT LOW MDM: CPT | Mod: 25

## 2019-10-01 PROCEDURE — 73110 X-RAY EXAM OF WRIST: CPT

## 2019-10-01 PROCEDURE — 90471 IMMUNIZATION ADMIN: CPT

## 2019-10-01 PROCEDURE — 90715 TDAP VACCINE 7 YRS/> IM: CPT

## 2019-10-01 PROCEDURE — 99283 EMERGENCY DEPT VISIT LOW MDM: CPT

## 2019-10-01 RX ORDER — TETANUS TOXOID, REDUCED DIPHTHERIA TOXOID AND ACELLULAR PERTUSSIS VACCINE, ADSORBED 5; 2.5; 8; 8; 2.5 [IU]/.5ML; [IU]/.5ML; UG/.5ML; UG/.5ML; UG/.5ML
0.5 SUSPENSION INTRAMUSCULAR ONCE
Refills: 0 | Status: COMPLETED | OUTPATIENT
Start: 2019-10-01 | End: 2019-10-01

## 2019-10-01 RX ORDER — IBUPROFEN 200 MG
600 TABLET ORAL ONCE
Refills: 0 | Status: COMPLETED | OUTPATIENT
Start: 2019-10-01 | End: 2019-10-01

## 2019-10-01 RX ADMIN — TETANUS TOXOID, REDUCED DIPHTHERIA TOXOID AND ACELLULAR PERTUSSIS VACCINE, ADSORBED 0.5 MILLILITER(S): 5; 2.5; 8; 8; 2.5 SUSPENSION INTRAMUSCULAR at 23:38

## 2019-10-01 RX ADMIN — Medication 1 TABLET(S): at 23:38

## 2019-10-01 RX ADMIN — Medication 600 MILLIGRAM(S): at 23:37

## 2019-10-01 NOTE — ED PROVIDER NOTE - PROGRESS NOTE DETAILS
PA Note: PT evaluated by intake physician. HPI/PE/ROS as noted above. Will follow up plan per intake physician. PA Note: Pts hand soaked in betadine, bacitracin and bandage applied. Pt put in splint. Pt instructed on wound care and to follow up with PCP. Pt to discharge with antibiotics. I performed the initial face to face bedside interview with this patient regarding history of present illness, review of symptoms and relevant past medical, social and family history.  I completed an independent physical examination.  I was the initial provider who evaluated this patient. I have signed out the follow up of any pending tests (i.e. labs, radiological studies) to the ACP.  I have communicated the patient’s plan of care and disposition with the ACP.
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
## 2019-10-01 NOTE — ED ADULT NURSE NOTE - NSIMPLEMENTINTERV_GEN_ALL_ED
Implemented All Universal Safety Interventions:  [place] to call system. Call bell, personal items and telephone within reach. Instruct patient to call for assistance. Room bathroom lighting operational. Non-slip footwear when patient is off stretcher. Physically safe environment: no spills, clutter or unnecessary equipment. Stretcher in lowest position, wheels locked, appropriate side rails in place.

## 2019-10-01 NOTE — ED PROVIDER NOTE - PATIENT PORTAL LINK FT
You can access the FollowMyHealth Patient Portal offered by [facility] by registering at the following website: [email or web address]. By joining [employer]’s FollowMyHealth portal, you will also be able to view your health information using other applications (apps) compatible with our system.

## 2019-10-01 NOTE — ED ADULT NURSE NOTE - NS PRO PASSIVE SMOKE EXP
On admission the patient was afebrile and tachypneic. Workup for LISHA and rhabdo was initiated. Additionally, CXR was obtained given that he was tachypneic; it revealed no acute processes or findings suggestive of pneumonia. He was hypertensive initially but improved after administration of home losartan dose. Several hours after arrival, oral temp was normal but a rectal temperature of 103 was obtained. Patient pancultured. U/a with leuk +2, wbc 22, and many bacteria. Patient started on vancomycin and pip-tazo for empiric coverage. Admitted to ICU for further management. Stepped down to hospital medicine 8/29. Has remained afebrile and hemodynamically stable since step-down. Sepsis likely secondary to UTI plus bacteremia. BCs grew E. Fecalis and urine culture grew Klebsiella pneumoniae. Sensitivities have returned and it is reasonable at this moment to de-escalate therapy to ciprofloxacin and ampicillin.     Plan:  - De-escalate from vancomycin and pip-tazo to cipro and ampicillin based on cultures' sensitivities   No

## 2019-10-11 ENCOUNTER — EMERGENCY (EMERGENCY)
Facility: HOSPITAL | Age: 24
LOS: 0 days | Discharge: HOME | End: 2019-10-12
Attending: EMERGENCY MEDICINE
Payer: MEDICAID

## 2019-10-11 VITALS
OXYGEN SATURATION: 100 % | WEIGHT: 139.99 LBS | TEMPERATURE: 102 F | SYSTOLIC BLOOD PRESSURE: 119 MMHG | RESPIRATION RATE: 18 BRPM | HEART RATE: 116 BPM | DIASTOLIC BLOOD PRESSURE: 69 MMHG | HEIGHT: 61 IN

## 2019-10-11 DIAGNOSIS — J18.9 PNEUMONIA, UNSPECIFIED ORGANISM: ICD-10-CM

## 2019-10-11 DIAGNOSIS — R50.9 FEVER, UNSPECIFIED: ICD-10-CM

## 2019-10-11 DIAGNOSIS — Z98.891 HISTORY OF UTERINE SCAR FROM PREVIOUS SURGERY: Chronic | ICD-10-CM

## 2019-10-11 PROCEDURE — 71045 X-RAY EXAM CHEST 1 VIEW: CPT

## 2019-10-11 PROCEDURE — 99284 EMERGENCY DEPT VISIT MOD MDM: CPT

## 2019-10-11 PROCEDURE — 93010 ELECTROCARDIOGRAM REPORT: CPT

## 2019-10-11 PROCEDURE — 93005 ELECTROCARDIOGRAM TRACING: CPT

## 2019-10-11 PROCEDURE — 85025 COMPLETE CBC W/AUTO DIFF WBC: CPT

## 2019-10-11 PROCEDURE — 85730 THROMBOPLASTIN TIME PARTIAL: CPT

## 2019-10-11 PROCEDURE — 96375 TX/PRO/DX INJ NEW DRUG ADDON: CPT

## 2019-10-11 PROCEDURE — 87631 RESP VIRUS 3-5 TARGETS: CPT

## 2019-10-11 PROCEDURE — 36415 COLL VENOUS BLD VENIPUNCTURE: CPT

## 2019-10-11 PROCEDURE — 80053 COMPREHEN METABOLIC PANEL: CPT

## 2019-10-11 PROCEDURE — 99284 EMERGENCY DEPT VISIT MOD MDM: CPT | Mod: 25

## 2019-10-11 PROCEDURE — 83605 ASSAY OF LACTIC ACID: CPT

## 2019-10-11 PROCEDURE — 71045 X-RAY EXAM CHEST 1 VIEW: CPT | Mod: 26

## 2019-10-11 PROCEDURE — 87040 BLOOD CULTURE FOR BACTERIA: CPT

## 2019-10-11 PROCEDURE — 96374 THER/PROPH/DIAG INJ IV PUSH: CPT

## 2019-10-11 PROCEDURE — 85610 PROTHROMBIN TIME: CPT

## 2019-10-11 RX ORDER — CEFTRIAXONE 500 MG/1
1000 INJECTION, POWDER, FOR SOLUTION INTRAMUSCULAR; INTRAVENOUS ONCE
Refills: 0 | Status: DISCONTINUED | OUTPATIENT
Start: 2019-10-11 | End: 2019-10-11

## 2019-10-11 RX ORDER — ACETAMINOPHEN 500 MG
650 TABLET ORAL ONCE
Refills: 0 | Status: COMPLETED | OUTPATIENT
Start: 2019-10-11 | End: 2019-10-11

## 2019-10-11 RX ORDER — AZITHROMYCIN 500 MG/1
500 TABLET, FILM COATED ORAL ONCE
Refills: 0 | Status: COMPLETED | OUTPATIENT
Start: 2019-10-11 | End: 2019-10-11

## 2019-10-11 RX ORDER — CEFTRIAXONE 500 MG/1
1000 INJECTION, POWDER, FOR SOLUTION INTRAMUSCULAR; INTRAVENOUS ONCE
Refills: 0 | Status: COMPLETED | OUTPATIENT
Start: 2019-10-11 | End: 2019-10-11

## 2019-10-11 RX ORDER — SODIUM CHLORIDE 9 MG/ML
1950 INJECTION, SOLUTION INTRAVENOUS ONCE
Refills: 0 | Status: COMPLETED | OUTPATIENT
Start: 2019-10-11 | End: 2019-10-11

## 2019-10-11 NOTE — ED ADULT TRIAGE NOTE - CHIEF COMPLAINT QUOTE
fever/chills, bodyaches, dry cough started this morning. No medication taken at home. Denies sick contact or recent travel.

## 2019-10-12 VITALS
SYSTOLIC BLOOD PRESSURE: 109 MMHG | TEMPERATURE: 99 F | RESPIRATION RATE: 18 BRPM | HEART RATE: 92 BPM | DIASTOLIC BLOOD PRESSURE: 67 MMHG | OXYGEN SATURATION: 100 %

## 2019-10-12 LAB
ALBUMIN SERPL ELPH-MCNC: 3.7 G/DL — SIGNIFICANT CHANGE UP (ref 3.3–5)
ALP SERPL-CCNC: 61 U/L — SIGNIFICANT CHANGE UP (ref 40–120)
ALT FLD-CCNC: 16 U/L — SIGNIFICANT CHANGE UP (ref 12–78)
ANION GAP SERPL CALC-SCNC: 10 MMOL/L — SIGNIFICANT CHANGE UP (ref 5–17)
APTT BLD: 27.2 SEC — LOW (ref 27.5–36.3)
AST SERPL-CCNC: 12 U/L — LOW (ref 15–37)
BASOPHILS # BLD AUTO: 0.05 K/UL — SIGNIFICANT CHANGE UP (ref 0–0.2)
BASOPHILS NFR BLD AUTO: 0.4 % — SIGNIFICANT CHANGE UP (ref 0–2)
BILIRUB SERPL-MCNC: 0.2 MG/DL — SIGNIFICANT CHANGE UP (ref 0.2–1.2)
BUN SERPL-MCNC: 10 MG/DL — SIGNIFICANT CHANGE UP (ref 7–23)
CALCIUM SERPL-MCNC: 8.7 MG/DL — SIGNIFICANT CHANGE UP (ref 8.5–10.1)
CHLORIDE SERPL-SCNC: 107 MMOL/L — SIGNIFICANT CHANGE UP (ref 96–108)
CO2 SERPL-SCNC: 24 MMOL/L — SIGNIFICANT CHANGE UP (ref 22–31)
CREAT SERPL-MCNC: 0.65 MG/DL — SIGNIFICANT CHANGE UP (ref 0.5–1.3)
EOSINOPHIL # BLD AUTO: 0.03 K/UL — SIGNIFICANT CHANGE UP (ref 0–0.5)
EOSINOPHIL NFR BLD AUTO: 0.2 % — SIGNIFICANT CHANGE UP (ref 0–6)
FLU A RESULT: SIGNIFICANT CHANGE UP
FLU A RESULT: SIGNIFICANT CHANGE UP
FLUAV AG NPH QL: SIGNIFICANT CHANGE UP
FLUBV AG NPH QL: SIGNIFICANT CHANGE UP
GLUCOSE SERPL-MCNC: 83 MG/DL — SIGNIFICANT CHANGE UP (ref 70–99)
HCT VFR BLD CALC: 32.3 % — LOW (ref 34.5–45)
HGB BLD-MCNC: 9.7 G/DL — LOW (ref 11.5–15.5)
IMM GRANULOCYTES NFR BLD AUTO: 0.5 % — SIGNIFICANT CHANGE UP (ref 0–1.5)
INR BLD: 1.06 RATIO — SIGNIFICANT CHANGE UP (ref 0.88–1.16)
LACTATE SERPL-SCNC: 1.2 MMOL/L — SIGNIFICANT CHANGE UP (ref 0.7–2)
LYMPHOCYTES # BLD AUTO: 1.07 K/UL — SIGNIFICANT CHANGE UP (ref 1–3.3)
LYMPHOCYTES # BLD AUTO: 8.4 % — LOW (ref 13–44)
MCHC RBC-ENTMCNC: 20.9 PG — LOW (ref 27–34)
MCHC RBC-ENTMCNC: 30 GM/DL — LOW (ref 32–36)
MCV RBC AUTO: 69.6 FL — LOW (ref 80–100)
MONOCYTES # BLD AUTO: 1.17 K/UL — HIGH (ref 0–0.9)
MONOCYTES NFR BLD AUTO: 9.1 % — SIGNIFICANT CHANGE UP (ref 2–14)
NEUTROPHILS # BLD AUTO: 10.43 K/UL — HIGH (ref 1.8–7.4)
NEUTROPHILS NFR BLD AUTO: 81.4 % — HIGH (ref 43–77)
PLATELET # BLD AUTO: 304 K/UL — SIGNIFICANT CHANGE UP (ref 150–400)
POTASSIUM SERPL-MCNC: 4 MMOL/L — SIGNIFICANT CHANGE UP (ref 3.5–5.3)
POTASSIUM SERPL-SCNC: 4 MMOL/L — SIGNIFICANT CHANGE UP (ref 3.5–5.3)
PROT SERPL-MCNC: 7.7 GM/DL — SIGNIFICANT CHANGE UP (ref 6–8.3)
PROTHROM AB SERPL-ACNC: 11.8 SEC — SIGNIFICANT CHANGE UP (ref 10–12.9)
RBC # BLD: 4.64 M/UL — SIGNIFICANT CHANGE UP (ref 3.8–5.2)
RBC # FLD: 21.1 % — HIGH (ref 10.3–14.5)
RSV RESULT: SIGNIFICANT CHANGE UP
RSV RNA RESP QL NAA+PROBE: SIGNIFICANT CHANGE UP
SODIUM SERPL-SCNC: 141 MMOL/L — SIGNIFICANT CHANGE UP (ref 135–145)
WBC # BLD: 12.81 K/UL — HIGH (ref 3.8–10.5)
WBC # FLD AUTO: 12.81 K/UL — HIGH (ref 3.8–10.5)

## 2019-10-12 RX ORDER — AZITHROMYCIN 500 MG/1
1 TABLET, FILM COATED ORAL
Qty: 4 | Refills: 0
Start: 2019-10-12 | End: 2019-10-15

## 2019-10-12 RX ADMIN — CEFTRIAXONE 1000 MILLIGRAM(S): 500 INJECTION, POWDER, FOR SOLUTION INTRAMUSCULAR; INTRAVENOUS at 00:18

## 2019-10-12 RX ADMIN — SODIUM CHLORIDE 1950 MILLILITER(S): 9 INJECTION, SOLUTION INTRAVENOUS at 00:06

## 2019-10-12 RX ADMIN — AZITHROMYCIN 255 MILLIGRAM(S): 500 TABLET, FILM COATED ORAL at 00:18

## 2019-10-12 RX ADMIN — Medication 650 MILLIGRAM(S): at 00:06

## 2019-10-12 NOTE — ED PROVIDER NOTE - PATIENT PORTAL LINK FT
You can access the FollowMyHealth Patient Portal offered by [facility] by registering at the following website: [email or web address]. By joining [employer]’s FollowMyHealth portal, you will also be able to view your health information using other applications (apps) compatible with our system.

## 2019-10-12 NOTE — ED ADULT NURSE NOTE - NSSEPSISCRITERIAMET_ED_A_ED
[address] Shanae Pearson [phone]  [address] [facility] [address]    Phone:  [phone]   · bumetanide 0.5 MG tablet  · doxazosin 4 MG tablet  · hydrALAZINE 25 MG tablet  · isosorbide mononitrate 60 MG extended release tablet     You can get these medications from any pharmacy    Bring a paper prescription for each of these medications  · HYDROcodone-acetaminophen 7.5-325 MG per tablet     Information about where to get these medications is not yet available    Ask your nurse or doctor about these medications  · gabapentin 600 MG tablet  · polyethylene glycol packet         Activity: activity as tolerated  Diet: DIET RENAL; Low Sodium (2 GM); Daily Fluid Restriction: 2000 ml      Disposition: SNF  Discharged Condition: Stable  Follow Up:   Sandeep Jaeger [address] [place] [address]  [phone]    Schedule an appointment as soon as possible for a visit in 1 week      Ayala Hudson MD  [place] [address]  [phone]    Schedule an appointment as soon as possible for a visit in 1 week      Marcos Rodríguez MD  [address] [place] [address] [address]  [phone]    Schedule an appointment as soon as possible for a visit in 1 week      Ros Barba, [facility]   [address] [place] [address]    Schedule an appointment as soon as possible for a visit in 2 weeks          Code status:  Full Code       Total time spent on discharge, finalizing medications, referrals and arranging outpatient follow up was more than 30 minutes      Thank you Dr. Sandeep Jaeger MD for the opportunity to be involved in this patients care. Abnormal VS & WBC

## 2019-10-12 NOTE — ED PROVIDER NOTE - PMH
No pertinent past medical history    No pertinent past medical history    No pertinent past medical history <<----- Click to add NO pertinent Past Medical History

## 2019-10-12 NOTE — ED PROVIDER NOTE - OBJECTIVE STATEMENT
24 year old female with no significant known PMH presents with cc of fever, chills, rigors, and cough, and expectoration of clear phlegm. No cp, palpitation. No syncope. No neck pain or stiffness. no skin rash. no recent exotic travel. No known IVDU, or ETOH abuse. no melena or hematochezia. no dysuria hematuria or frequency. No trauma. No vaginal bleeding. No hx of known STD/STI. No saddle anesthesia, incontinence, focal neurological deficits or visual problems. Onset for about a day or two, gradual, constant but not improving, no other significant associated symptoms.

## 2019-10-12 NOTE — ED PROVIDER NOTE - PROGRESS NOTE DETAILS
Prior to dc patient updated of results, copies of labs imaging provided, meds sent and questions answered. Patient told to return if any worsening of symptoms.

## 2019-10-12 NOTE — ED PROVIDER NOTE - CARE PLAN
Principal Discharge DX:	Pneumonia due to infectious organism, unspecified laterality, unspecified part of lung Principal Discharge DX:	Pneumonia due to infectious organism, unspecified laterality, unspecified part of lung  Secondary Diagnosis:	Fever, unspecified fever cause  Secondary Diagnosis:	Chills

## 2019-10-12 NOTE — ED PROVIDER NOTE - CLINICAL SUMMARY MEDICAL DECISION MAKING FREE TEXT BOX
Findings consistent with community acquired pneumonia (according to my interpretation of the x-ray) with low risk features, patient to go home with abx, to see PMD asap and to return if any worsening of symptoms, if persistent fever, if any hemoptysis or if any other concerns. Patient provided with results.

## 2019-10-17 LAB
CULTURE RESULTS: SIGNIFICANT CHANGE UP
CULTURE RESULTS: SIGNIFICANT CHANGE UP
SPECIMEN SOURCE: SIGNIFICANT CHANGE UP
SPECIMEN SOURCE: SIGNIFICANT CHANGE UP

## 2020-02-01 ENCOUNTER — EMERGENCY (EMERGENCY)
Facility: HOSPITAL | Age: 25
LOS: 0 days | Discharge: HOME | End: 2020-02-01
Attending: EMERGENCY MEDICINE
Payer: MEDICAID

## 2020-02-01 VITALS
DIASTOLIC BLOOD PRESSURE: 72 MMHG | HEART RATE: 95 BPM | SYSTOLIC BLOOD PRESSURE: 115 MMHG | OXYGEN SATURATION: 99 % | TEMPERATURE: 98 F | RESPIRATION RATE: 18 BRPM

## 2020-02-01 VITALS — WEIGHT: 139.99 LBS

## 2020-02-01 DIAGNOSIS — Z98.891 HISTORY OF UTERINE SCAR FROM PREVIOUS SURGERY: Chronic | ICD-10-CM
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
## 2020-02-01 DIAGNOSIS — S41.112A LACERATION WITHOUT FOREIGN BODY OF LEFT UPPER ARM, INITIAL ENCOUNTER: ICD-10-CM

## 2020-02-01 DIAGNOSIS — Z23 ENCOUNTER FOR IMMUNIZATION: ICD-10-CM
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
## 2020-02-01 PROCEDURE — 99283 EMERGENCY DEPT VISIT LOW MDM: CPT | Mod: 25

## 2020-02-01 PROCEDURE — 99283 EMERGENCY DEPT VISIT LOW MDM: CPT

## 2020-02-01 PROCEDURE — 90715 TDAP VACCINE 7 YRS/> IM: CPT

## 2020-02-01 PROCEDURE — 12001 RPR S/N/AX/GEN/TRNK 2.5CM/<: CPT

## 2020-02-01 PROCEDURE — 90471 IMMUNIZATION ADMIN: CPT

## 2020-02-01 RX ORDER — CEPHALEXIN 500 MG
500 CAPSULE ORAL ONCE
Refills: 0 | Status: COMPLETED | OUTPATIENT
Start: 2020-02-01 | End: 2020-02-01

## 2020-02-01 RX ORDER — TETANUS TOXOID, REDUCED DIPHTHERIA TOXOID AND ACELLULAR PERTUSSIS VACCINE, ADSORBED 5; 2.5; 8; 8; 2.5 [IU]/.5ML; [IU]/.5ML; UG/.5ML; UG/.5ML; UG/.5ML
0.5 SUSPENSION INTRAMUSCULAR ONCE
Refills: 0 | Status: COMPLETED | OUTPATIENT
Start: 2020-02-01 | End: 2020-02-01

## 2020-02-01 RX ADMIN — Medication 500 MILLIGRAM(S): at 16:27

## 2020-02-01 RX ADMIN — TETANUS TOXOID, REDUCED DIPHTHERIA TOXOID AND ACELLULAR PERTUSSIS VACCINE, ADSORBED 0.5 MILLILITER(S): 5; 2.5; 8; 8; 2.5 SUSPENSION INTRAMUSCULAR at 16:27

## 2020-02-01 NOTE — ED STATDOCS - SKIN, MLM
skin normal color for race, warm, dry, laceration LUE approx 1cm linear, no active bleeding, neurovascularly intact distally, no induration
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
## 2020-02-01 NOTE — ED STATDOCS - PATIENT PORTAL LINK FT
You can access the FollowMyHealth Patient Portal offered by [facility] by registering at the following website: [email or web address]. By joining [employer]’s FollowMyHealth portal, you will also be able to view your health information using other applications (apps) compatible with our system.
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
## 2020-02-01 NOTE — ED ADULT TRIAGE NOTE - CHIEF COMPLAINT QUOTE
pt presents to ED due to lac to left upper arm pt fell onto plastic cover for AC unit bleeding controlled

## 2020-02-01 NOTE — ED STATDOCS - NS_ ATTENDINGSCRIBEDETAILS _ED_A_ED_FT
I, Alejandro Pat MD,  performed the initial face to face bedside interview with this patient regarding history of present illness, review of symptoms and relevant past medical, social and family history.  I completed an independent physical examination.    The history, relevant review of systems, past medical and surgical history, medical decision making, and physical examination was documented by the scribe in my presence and I attest to the accuracy of the documentation.

## 2020-02-01 NOTE — ED STATDOCS - NSFOLLOWUPINSTRUCTIONS_ED_ALL_ED_FT
DO not get the wound wet for 24 hours. Afterwards, wash with soap and water but no scrubbing. Apply bacitracin 4 times a day.     Return to the ER for any new or other concerns.      Laceration    WHAT YOU NEED TO KNOW:    A laceration is an injury to the skin and the soft tissue underneath it. Lacerations happen when you are cut or hit by something. They can happen anywhere on the body.     DISCHARGE INSTRUCTIONS:    Return to the emergency department if:     You have heavy bleeding or bleeding that does not stop after 10 minutes of holding firm, direct pressure over the wound.       Your wound opens up.     Contact your healthcare provider if:     You have a fever or chills.       Your laceration is red, warm, or swollen.      You have red streaks on your skin coming from your wound.      You have white or yellow drainage from the wound that smells bad.      You have pain that gets worse, even after treatment.       You have questions or concerns about your condition or care.     Medicines:     Take your medicine as directed. Contact your healthcare provider if you think your medicine is not helping or if you have side effects. Tell him or her if you are allergic to any medicine. Keep a list of the medicines, vitamins, and herbs you take. Include the amounts, and when and why you take them. Bring the list or the pill bottles to follow-up visits. Carry your medicine list with you in case of an emergency.    Care for your wound as directed:     Do not get your wound wet until your healthcare provider says it is okay. Do not soak your wound in water. Do not go swimming until your healthcare provider says it is okay. Carefully wash the wound with soap and water. Gently pat the area dry or allow it to air dry.       Change your bandages when they get wet, dirty, or after washing. Apply new, clean bandages as directed. Do not apply elastic bandages or tape too tight. Do not put powders or lotions over your incision.       Apply antibiotic ointment as directed. Your healthcare provider may give you antibiotic ointment to put over your wound if you have stitches. If you have strips of tape over your incision, let them dry up and fall off on their own. If they do not fall off within 14 days, gently remove them. If you have glue over your wound, do not remove or pick at it. If your glue comes off, do not replace it with glue that you have at home.       Check your wound every day for signs of infection such as swelling, redness, or pus.     Self-care:     Apply ice on your wound for 15 to 20 minutes every hour or as directed. Use an ice pack, or put crushed ice in a plastic bag. Cover it with a towel. Ice helps prevent tissue damage and decreases swelling and pain.      Decrease scarring of your wound by applying ointments as directed. Do not apply ointments until your healthcare provider says it is okay. You may need to wait until your wound is healed. Ask which ointment to buy and how often to use it. After your wound is healed, use sunscreen over the area when you are out in the sun. You should do this for at least 6 months to 1 year after your injury.     Follow up with your healthcare provider as directed: You may need to follow up in 24 to 48 hours to have your wound checked for infection. You will need to return in 3 to 14 days if you have stitches or staples so they can be removed. Care for your wound as directed to prevent infection and help it heal. Write down your questions so you remember to ask them during your visits.

## 2021-04-24 ENCOUNTER — EMERGENCY (EMERGENCY)
Facility: HOSPITAL | Age: 26
LOS: 1 days | Discharge: HOME | End: 2021-04-24
Attending: EMERGENCY MEDICINE
Payer: COMMERCIAL

## 2021-04-24 VITALS
RESPIRATION RATE: 20 BRPM | OXYGEN SATURATION: 100 % | TEMPERATURE: 98 F | SYSTOLIC BLOOD PRESSURE: 112 MMHG | DIASTOLIC BLOOD PRESSURE: 72 MMHG | HEIGHT: 72 IN | HEART RATE: 83 BPM

## 2021-04-24 VITALS
RESPIRATION RATE: 18 BRPM | HEART RATE: 82 BPM | TEMPERATURE: 98 F | DIASTOLIC BLOOD PRESSURE: 68 MMHG | OXYGEN SATURATION: 99 % | SYSTOLIC BLOOD PRESSURE: 108 MMHG

## 2021-04-24 DIAGNOSIS — Z98.891 HISTORY OF UTERINE SCAR FROM PREVIOUS SURGERY: Chronic | ICD-10-CM

## 2021-04-24 LAB
ALBUMIN SERPL ELPH-MCNC: 4.6 G/DL — SIGNIFICANT CHANGE UP (ref 3.3–5.2)
ALP SERPL-CCNC: 62 U/L — SIGNIFICANT CHANGE UP (ref 40–120)
ALT FLD-CCNC: 9 U/L — SIGNIFICANT CHANGE UP
ANION GAP SERPL CALC-SCNC: 13 MMOL/L — SIGNIFICANT CHANGE UP (ref 5–17)
APPEARANCE UR: CLEAR — SIGNIFICANT CHANGE UP
AST SERPL-CCNC: 16 U/L — SIGNIFICANT CHANGE UP
BACTERIA # UR AUTO: ABNORMAL
BASE EXCESS BLDV CALC-SCNC: -2.2 MMOL/L — LOW (ref -2–2)
BASOPHILS # BLD AUTO: 0.06 K/UL — SIGNIFICANT CHANGE UP (ref 0–0.2)
BASOPHILS NFR BLD AUTO: 0.3 % — SIGNIFICANT CHANGE UP (ref 0–2)
BILIRUB SERPL-MCNC: 0.3 MG/DL — LOW (ref 0.4–2)
BILIRUB UR-MCNC: NEGATIVE — SIGNIFICANT CHANGE UP
BLD GP AB SCN SERPL QL: SIGNIFICANT CHANGE UP
BUN SERPL-MCNC: 11 MG/DL — SIGNIFICANT CHANGE UP (ref 8–20)
CA-I SERPL-SCNC: 1.14 MMOL/L — LOW (ref 1.15–1.33)
CALCIUM SERPL-MCNC: 10 MG/DL — SIGNIFICANT CHANGE UP (ref 8.6–10.2)
CHLORIDE BLDV-SCNC: 110 MMOL/L — HIGH (ref 98–107)
CHLORIDE SERPL-SCNC: 102 MMOL/L — SIGNIFICANT CHANGE UP (ref 98–107)
CO2 SERPL-SCNC: 25 MMOL/L — SIGNIFICANT CHANGE UP (ref 22–29)
COLOR SPEC: YELLOW — SIGNIFICANT CHANGE UP
CREAT SERPL-MCNC: 0.63 MG/DL — SIGNIFICANT CHANGE UP (ref 0.5–1.3)
DIFF PNL FLD: ABNORMAL
EOSINOPHIL # BLD AUTO: 0.03 K/UL — SIGNIFICANT CHANGE UP (ref 0–0.5)
EOSINOPHIL NFR BLD AUTO: 0.2 % — SIGNIFICANT CHANGE UP (ref 0–6)
EPI CELLS # UR: ABNORMAL
GAS PNL BLDV: 141 MMOL/L — SIGNIFICANT CHANGE UP (ref 135–145)
GAS PNL BLDV: SIGNIFICANT CHANGE UP
GAS PNL BLDV: SIGNIFICANT CHANGE UP
GLUCOSE BLDV-MCNC: 77 MG/DL — SIGNIFICANT CHANGE UP (ref 70–99)
GLUCOSE SERPL-MCNC: 77 MG/DL — SIGNIFICANT CHANGE UP (ref 70–99)
GLUCOSE UR QL: NEGATIVE MG/DL — SIGNIFICANT CHANGE UP
HCG SERPL-ACNC: 8321 MIU/ML — HIGH
HCO3 BLDV-SCNC: 22 MMOL/L — SIGNIFICANT CHANGE UP (ref 20–26)
HCT VFR BLD CALC: 40.8 % — SIGNIFICANT CHANGE UP (ref 34.5–45)
HCT VFR BLDA CALC: 37 — LOW (ref 39–50)
HGB BLD CALC-MCNC: 12.2 G/DL — SIGNIFICANT CHANGE UP (ref 11.5–15.5)
HGB BLD-MCNC: 12.8 G/DL — SIGNIFICANT CHANGE UP (ref 11.5–15.5)
HYALINE CASTS # UR AUTO: ABNORMAL /LPF
IMM GRANULOCYTES NFR BLD AUTO: 0.4 % — SIGNIFICANT CHANGE UP (ref 0–1.5)
KETONES UR-MCNC: ABNORMAL
LACTATE BLDV-MCNC: 1.7 MMOL/L — SIGNIFICANT CHANGE UP (ref 0.5–2)
LEUKOCYTE ESTERASE UR-ACNC: ABNORMAL
LYMPHOCYTES # BLD AUTO: 1.4 K/UL — SIGNIFICANT CHANGE UP (ref 1–3.3)
LYMPHOCYTES # BLD AUTO: 7.3 % — LOW (ref 13–44)
MCHC RBC-ENTMCNC: 25.9 PG — LOW (ref 27–34)
MCHC RBC-ENTMCNC: 31.4 GM/DL — LOW (ref 32–36)
MCV RBC AUTO: 82.6 FL — SIGNIFICANT CHANGE UP (ref 80–100)
MONOCYTES # BLD AUTO: 0.92 K/UL — HIGH (ref 0–0.9)
MONOCYTES NFR BLD AUTO: 4.8 % — SIGNIFICANT CHANGE UP (ref 2–14)
NEUTROPHILS # BLD AUTO: 16.64 K/UL — HIGH (ref 1.8–7.4)
NEUTROPHILS NFR BLD AUTO: 87 % — HIGH (ref 43–77)
NITRITE UR-MCNC: NEGATIVE — SIGNIFICANT CHANGE UP
OTHER CELLS CSF MANUAL: 14 ML/DL — LOW (ref 18–22)
PCO2 BLDV: 47 MMHG — HIGH (ref 39–42)
PH BLDV: 7.32 — SIGNIFICANT CHANGE UP (ref 7.32–7.43)
PH UR: 6.5 — SIGNIFICANT CHANGE UP (ref 5–8)
PLATELET # BLD AUTO: 295 K/UL — SIGNIFICANT CHANGE UP (ref 150–400)
PO2 BLDV: 57 MMHG — HIGH (ref 25–45)
POTASSIUM BLDV-SCNC: 3.7 MMOL/L — SIGNIFICANT CHANGE UP (ref 3.4–4.5)
POTASSIUM SERPL-MCNC: 3.9 MMOL/L — SIGNIFICANT CHANGE UP (ref 3.5–5.3)
POTASSIUM SERPL-SCNC: 3.9 MMOL/L — SIGNIFICANT CHANGE UP (ref 3.5–5.3)
PROT SERPL-MCNC: 7.8 G/DL — SIGNIFICANT CHANGE UP (ref 6.6–8.7)
PROT UR-MCNC: 30 MG/DL
RBC # BLD: 4.94 M/UL — SIGNIFICANT CHANGE UP (ref 3.8–5.2)
RBC # FLD: 19.8 % — HIGH (ref 10.3–14.5)
RBC CASTS # UR COMP ASSIST: SIGNIFICANT CHANGE UP /HPF (ref 0–4)
SAO2 % BLDV: 87 % — SIGNIFICANT CHANGE UP
SODIUM SERPL-SCNC: 140 MMOL/L — SIGNIFICANT CHANGE UP (ref 135–145)
SP GR SPEC: 1.02 — SIGNIFICANT CHANGE UP (ref 1.01–1.02)
UROBILINOGEN FLD QL: 1 MG/DL
WBC # BLD: 19.12 K/UL — HIGH (ref 3.8–10.5)
WBC # FLD AUTO: 19.12 K/UL — HIGH (ref 3.8–10.5)
WBC UR QL: ABNORMAL

## 2021-04-24 PROCEDURE — 96361 HYDRATE IV INFUSION ADD-ON: CPT

## 2021-04-24 PROCEDURE — 82947 ASSAY GLUCOSE BLOOD QUANT: CPT

## 2021-04-24 PROCEDURE — 85018 HEMOGLOBIN: CPT

## 2021-04-24 PROCEDURE — 84295 ASSAY OF SERUM SODIUM: CPT

## 2021-04-24 PROCEDURE — 76817 TRANSVAGINAL US OBSTETRIC: CPT

## 2021-04-24 PROCEDURE — 76801 OB US < 14 WKS SINGLE FETUS: CPT | Mod: 26

## 2021-04-24 PROCEDURE — 84132 ASSAY OF SERUM POTASSIUM: CPT

## 2021-04-24 PROCEDURE — 82803 BLOOD GASES ANY COMBINATION: CPT

## 2021-04-24 PROCEDURE — 84702 CHORIONIC GONADOTROPIN TEST: CPT

## 2021-04-24 PROCEDURE — 80053 COMPREHEN METABOLIC PANEL: CPT

## 2021-04-24 PROCEDURE — 93005 ELECTROCARDIOGRAM TRACING: CPT

## 2021-04-24 PROCEDURE — 83605 ASSAY OF LACTIC ACID: CPT

## 2021-04-24 PROCEDURE — 76801 OB US < 14 WKS SINGLE FETUS: CPT

## 2021-04-24 PROCEDURE — 76817 TRANSVAGINAL US OBSTETRIC: CPT | Mod: 26,59

## 2021-04-24 PROCEDURE — 87086 URINE CULTURE/COLONY COUNT: CPT

## 2021-04-24 PROCEDURE — 86900 BLOOD TYPING SEROLOGIC ABO: CPT

## 2021-04-24 PROCEDURE — 85014 HEMATOCRIT: CPT

## 2021-04-24 PROCEDURE — 36415 COLL VENOUS BLD VENIPUNCTURE: CPT

## 2021-04-24 PROCEDURE — 82330 ASSAY OF CALCIUM: CPT

## 2021-04-24 PROCEDURE — 99284 EMERGENCY DEPT VISIT MOD MDM: CPT | Mod: 25

## 2021-04-24 PROCEDURE — 93010 ELECTROCARDIOGRAM REPORT: CPT

## 2021-04-24 PROCEDURE — 81001 URINALYSIS AUTO W/SCOPE: CPT

## 2021-04-24 PROCEDURE — 86850 RBC ANTIBODY SCREEN: CPT

## 2021-04-24 PROCEDURE — 85025 COMPLETE CBC W/AUTO DIFF WBC: CPT

## 2021-04-24 PROCEDURE — 82435 ASSAY OF BLOOD CHLORIDE: CPT

## 2021-04-24 PROCEDURE — 99285 EMERGENCY DEPT VISIT HI MDM: CPT

## 2021-04-24 PROCEDURE — 86901 BLOOD TYPING SEROLOGIC RH(D): CPT

## 2021-04-24 PROCEDURE — 96374 THER/PROPH/DIAG INJ IV PUSH: CPT

## 2021-04-24 RX ORDER — SODIUM CHLORIDE 9 MG/ML
1000 INJECTION INTRAMUSCULAR; INTRAVENOUS; SUBCUTANEOUS ONCE
Refills: 0 | Status: COMPLETED | OUTPATIENT
Start: 2021-04-24 | End: 2021-04-24

## 2021-04-24 RX ORDER — ONDANSETRON 8 MG/1
4 TABLET, FILM COATED ORAL ONCE
Refills: 0 | Status: COMPLETED | OUTPATIENT
Start: 2021-04-24 | End: 2021-04-24

## 2021-04-24 RX ORDER — CEPHALEXIN 500 MG
1 CAPSULE ORAL
Qty: 28 | Refills: 0
Start: 2021-04-24 | End: 2021-04-30

## 2021-04-24 RX ORDER — CEPHALEXIN 500 MG
500 CAPSULE ORAL ONCE
Refills: 0 | Status: DISCONTINUED | OUTPATIENT
Start: 2021-04-24 | End: 2021-04-29

## 2021-04-24 RX ADMIN — SODIUM CHLORIDE 3000 MILLILITER(S): 9 INJECTION INTRAMUSCULAR; INTRAVENOUS; SUBCUTANEOUS at 13:18

## 2021-04-24 RX ADMIN — ONDANSETRON 4 MILLIGRAM(S): 8 TABLET, FILM COATED ORAL at 13:18

## 2021-04-24 RX ADMIN — SODIUM CHLORIDE 1000 MILLILITER(S): 9 INJECTION INTRAMUSCULAR; INTRAVENOUS; SUBCUTANEOUS at 14:49

## 2021-04-24 NOTE — ED PROVIDER NOTE - PATIENT PORTAL LINK FT
You can access the FollowMyHealth Patient Portal offered by [facility] by registering at the following website: [email or web address]. By joining [employer]’s FollowMyHealth portal, you will also be able to view your health information using other applications (apps) compatible with our system.

## 2021-04-24 NOTE — ED PROVIDER NOTE - CLINICAL SUMMARY MEDICAL DECISION MAKING FREE TEXT BOX
vaginal spotting + upreg will check labs sono to eval for threatened  vs ectopic; fluids po challenge; no longer anxious not intrested in speaking to psych will dc with gyn follow up (sees mid wife at [facility])

## 2021-04-24 NOTE — ED PROVIDER NOTE - PROGRESS NOTE DETAILS
results discussed withp t feeling better tolerating po wants to go home mild uti will tx with keflex dc with [facility] ob

## 2021-04-24 NOTE — ED PROVIDER NOTE - NSFOLLOWUPINSTRUCTIONS_ED_ALL_ED_FT
return to the ED if fever, chills, abdominal pain, vaginal bleeding. follow up with your OB within 1 week.

## 2021-04-24 NOTE — ED PROVIDER NOTE - OBJECTIVE STATEMENT
26yo F hx of anxiety pw nausea this morning tried to make herself vomit and got a panic attack. notes that she now feels better no longer anxious. sttates + nausa x 3 weeks +upreg at home LMP 3/14.  NOTES VAGINAL SPOTTING x few days. denies abd pain. Denies f/c//v/cp/sob/palpitations/ cough/rash/headache/dizziness/abd.pain/d/c/dysuria/hematuria. no sick contacts no recent travel

## 2021-04-24 NOTE — ED PROVIDER NOTE - CARE PLAN
Principal Discharge DX:	Threatened    Principal Discharge DX:	Threatened   Secondary Diagnosis:	Urinary tract infection without hematuria, site unspecified

## 2021-04-24 NOTE — ED PROVIDER NOTE - NS ED ROS FT
Denies f/c//v/cp/sob/palpitations/ cough/rash/headache/dizziness/abd.pain/d/c/dysuria/hematuria  +vaginal spotting + nausea + anxiety

## 2021-04-26 LAB
CULTURE RESULTS: SIGNIFICANT CHANGE UP
SPECIMEN SOURCE: SIGNIFICANT CHANGE UP

## 2021-06-07 ENCOUNTER — APPOINTMENT (OUTPATIENT)
Dept: ANTEPARTUM | Facility: CLINIC | Age: 26
End: 2021-06-07

## 2021-06-09 ENCOUNTER — APPOINTMENT (OUTPATIENT)
Dept: ANTEPARTUM | Facility: CLINIC | Age: 26
End: 2021-06-09

## 2021-06-14 ENCOUNTER — ASOB RESULT (OUTPATIENT)
Age: 26
End: 2021-06-14

## 2021-06-14 ENCOUNTER — APPOINTMENT (OUTPATIENT)
Dept: ANTEPARTUM | Facility: CLINIC | Age: 26
End: 2021-06-14
Payer: MEDICAID

## 2021-06-14 ENCOUNTER — NON-APPOINTMENT (OUTPATIENT)
Age: 26
End: 2021-06-14

## 2021-06-14 PROCEDURE — ZZZZZ: CPT

## 2021-06-14 PROCEDURE — 76813 OB US NUCHAL MEAS 1 GEST: CPT

## 2021-06-17 LAB
1ST TRIMESTER DATA: NORMAL
ADDENDUM DOC: NORMAL
AFP PNL SERPL: NORMAL
AFP SERPL-ACNC: NORMAL
CLINICAL BIOCHEMIST REVIEW: NORMAL
FREE BETA HCG 1ST TRIMESTER: NORMAL
Lab: NORMAL
NOTES NTD: NORMAL
NT: NORMAL
PAPP-A SERPL-ACNC: NORMAL
TRISOMY 18/3: NORMAL

## 2021-07-06 ENCOUNTER — APPOINTMENT (OUTPATIENT)
Dept: ANTEPARTUM | Facility: CLINIC | Age: 26
End: 2021-07-06

## 2021-07-08 ENCOUNTER — APPOINTMENT (OUTPATIENT)
Dept: ANTEPARTUM | Facility: CLINIC | Age: 26
End: 2021-07-08
Payer: MEDICAID

## 2021-07-08 ENCOUNTER — ASOB RESULT (OUTPATIENT)
Age: 26
End: 2021-07-08

## 2021-07-08 ENCOUNTER — APPOINTMENT (OUTPATIENT)
Dept: MATERNAL FETAL MEDICINE | Facility: CLINIC | Age: 26
End: 2021-07-08
Payer: MEDICAID

## 2021-07-08 ENCOUNTER — NON-APPOINTMENT (OUTPATIENT)
Age: 26
End: 2021-07-08

## 2021-07-08 PROCEDURE — 99202 OFFICE O/P NEW SF 15 MIN: CPT | Mod: TH,95

## 2021-07-08 PROCEDURE — 36415 COLL VENOUS BLD VENIPUNCTURE: CPT

## 2021-07-16 ENCOUNTER — NON-APPOINTMENT (OUTPATIENT)
Age: 26
End: 2021-07-16

## 2021-08-03 ENCOUNTER — APPOINTMENT (OUTPATIENT)
Dept: ANTEPARTUM | Facility: CLINIC | Age: 26
End: 2021-08-03
Payer: MEDICAID

## 2021-08-03 ENCOUNTER — ASOB RESULT (OUTPATIENT)
Age: 26
End: 2021-08-03

## 2021-08-03 ENCOUNTER — APPOINTMENT (OUTPATIENT)
Dept: MATERNAL FETAL MEDICINE | Facility: CLINIC | Age: 26
End: 2021-08-03
Payer: MEDICAID

## 2021-08-03 VITALS
DIASTOLIC BLOOD PRESSURE: 78 MMHG | RESPIRATION RATE: 16 BRPM | SYSTOLIC BLOOD PRESSURE: 128 MMHG | BODY MASS INDEX: 25.68 KG/M2 | HEIGHT: 61 IN | HEART RATE: 83 BPM | WEIGHT: 136 LBS | OXYGEN SATURATION: 98 %

## 2021-08-03 VITALS — BODY MASS INDEX: 25.7 KG/M2 | WEIGHT: 136 LBS

## 2021-08-03 DIAGNOSIS — D56.3 THALASSEMIA MINOR: ICD-10-CM

## 2021-08-03 DIAGNOSIS — O28.0 ABNORMAL HEMATOLOGICAL FINDING ON ANTENATAL SCREENING OF MOTHER: ICD-10-CM

## 2021-08-03 DIAGNOSIS — O09.899 ABNORMAL HEMATOLOGICAL FINDING ON ANTENATAL SCREENING OF MOTHER: ICD-10-CM

## 2021-08-03 DIAGNOSIS — Z86.2 PERSONAL HISTORY OF DISEASES OF THE BLOOD AND BLOOD-FORMING ORGANS AND CERTAIN DISORDERS INVOLVING THE IMMUNE MECHANISM: ICD-10-CM

## 2021-08-03 DIAGNOSIS — Z78.9 OTHER SPECIFIED HEALTH STATUS: ICD-10-CM

## 2021-08-03 DIAGNOSIS — Z98.891 HISTORY OF UTERINE SCAR FROM PREVIOUS SURGERY: ICD-10-CM

## 2021-08-03 PROCEDURE — 36415 COLL VENOUS BLD VENIPUNCTURE: CPT

## 2021-08-03 PROCEDURE — 76811 OB US DETAILED SNGL FETUS: CPT

## 2021-08-03 PROCEDURE — ZZZZZ: CPT

## 2021-08-03 PROCEDURE — 99204 OFFICE O/P NEW MOD 45 MIN: CPT

## 2021-08-03 PROCEDURE — 76817 TRANSVAGINAL US OBSTETRIC: CPT

## 2021-08-03 RX ORDER — PRENATAL VIT NO.126/IRON/FOLIC 28MG-0.8MG
28-0.8 TABLET ORAL
Refills: 0 | Status: ACTIVE | COMMUNITY

## 2021-08-03 NOTE — DISCUSSION/SUMMARY
[FreeTextEntry1] : The patient is a 25-year-old -0-1-3 being seen today at approximately 20 weeks for previous  section x3 and Low SHAYNA-a at the 5th percentile.\par \par Her obstetrical history is significant for delivery in 2015, 18 and 19 of liveborn male 's.  The first weighing 6 pounds 4 ounces, the second 6 pounds 2 ounces in the last 6 pounds 0 ounces.  First delivery was at 39 weeks via  section for arrest of dilatation at 4 cm.  She had a subsequent failed  with the arrest of dictation at 6 cm and the last delivery was an elective  section.  She also had 1 first trimester miscarriage which did require D&C.  She had preeclampsia in her second pregnancy and thinks her SHAYNA-A was low with that pregnancy as well.\par \par A comprehensive ultrasound was performed today and reveals a single viable intrauterine gestation with size consistent with dates.  No gross or soft markers associated with fetal aneuploidy are noted.  The placenta is anterior without evidence of previa.  The lower uterine segment has normal thickness.  Vital signs today reveal a blood pressure of 128/78 and maternal weight is 136 pounds consistent with a BMI of 25.7 kg.\par \par Low SHAYNA-A (5th %);\par \par The patient had Ultra-Screen evaluation performed which revealed low risk for fetal aneuploidy.  The SHAYNA-A  is low at the 5th percentile.  The significance of this abnormal analyte was discussed.  The patient understands that her pregnancy is at increased risk for loss prior to 24 weeks, poor fetal weight gain and/or preeclampsia and delivery prior to 32 weeks.  We discussed the use of low-dose aspirin in pregnancies with this abnormal analyte.  Studies have demonstrated a statistical significant decrease in the incidence of preeclampsia and or the delay in presentation and severity.  She was advised to start 1 tablet on odd days and 2 tablet on even days which she will continue until 37 weeks.  This medication is best started by 16 weeks and its efficacy after 16 weeks is diminished.  Sequential blood work was drawn today.  She has also had noninterventional prenatal screen which was also found to be within normal limits.  A growth scan with umbilical Doppler artery study will be performed at 28 weeks and again between 34 and 36 weeks.  All of the above was discussed with the patient all of her questions were answered.\par \par  Section X's 3;\par \par The patient understands that there is increased risk for problems related to multiple C/S.  Increased risk for\par disruption of the previous scar.  Possible placenta accreta and increased risk for uterine atony or difficulty\par with closure of the hysterotomy scar which might require hysterectomy and or transfusion.\par \par COVID-19 vaccination;\par \par COVID-19 vaccination in pregnancy was discussed.  We advise pregnant patients to be vaccinated.  Risks and benefits of the vaccination were discussed and after all the patient's questions were answered the patient has opted for vaccination after delivery.\par \par Her maternal grandmother has hypertensive disease.  She has no previous medical history.  She has had 3 D&Cs and 1 D&C.  She has no known allergies to medications and denies alcohol, tobacco or drug use.\par \par I spent a total of 47 minutes reviewing the patient's prenatal record, prenatal blood work, outside medical records, previous consultations and ultrasound reports counseling and coordinating care.\par \par Recommendations;\par \par 1.  Sequential blood work obtained today.\par 2.  Low-dose aspirin 1 tablet on odd days and 2 tablet on even days until 37 weeks.\par 3.  Growth scan with umbilical Doppler study at 28 weeks and between 34 and 36 weeks.\par 4.  Follow-up maternal-fetal medicine consultation as clinically indicated.

## 2021-08-06 LAB
1ST TRIMESTER DATA: NORMAL
2ND TRIMESTER DATA: NORMAL
ADDENDUM DOC: NORMAL
AFP PNL SERPL: NORMAL
AFP SERPL-ACNC: NORMAL
AFP SERPL-ACNC: NORMAL
B-HCG FREE SERPL-MCNC: NORMAL
CLINICAL BIOCHEMIST REVIEW: NORMAL
FREE BETA HCG 1ST TRIMESTER: NORMAL
INHIBIN A SERPL-MCNC: NORMAL
NOTES NTD: NORMAL
NT: NORMAL
PAPP-A SERPL-ACNC: NORMAL
U ESTRIOL SERPL-SCNC: NORMAL

## 2021-08-20 ENCOUNTER — APPOINTMENT (OUTPATIENT)
Dept: ANTEPARTUM | Facility: CLINIC | Age: 26
End: 2021-08-20

## 2021-08-30 ENCOUNTER — OUTPATIENT (OUTPATIENT)
Dept: INPATIENT UNIT | Facility: HOSPITAL | Age: 26
LOS: 1 days | End: 2021-08-30
Payer: COMMERCIAL

## 2021-08-30 VITALS — SYSTOLIC BLOOD PRESSURE: 104 MMHG | HEART RATE: 68 BPM | DIASTOLIC BLOOD PRESSURE: 59 MMHG

## 2021-08-30 VITALS — HEART RATE: 71 BPM | SYSTOLIC BLOOD PRESSURE: 107 MMHG | DIASTOLIC BLOOD PRESSURE: 62 MMHG

## 2021-08-30 DIAGNOSIS — Z98.891 HISTORY OF UTERINE SCAR FROM PREVIOUS SURGERY: Chronic | ICD-10-CM

## 2021-08-30 DIAGNOSIS — O47.02 FALSE LABOR BEFORE 37 COMPLETED WEEKS OF GESTATION, SECOND TRIMESTER: ICD-10-CM

## 2021-08-30 LAB
APPEARANCE UR: CLEAR — SIGNIFICANT CHANGE UP
BACTERIA # UR AUTO: ABNORMAL
BILIRUB UR-MCNC: NEGATIVE — SIGNIFICANT CHANGE UP
COLOR SPEC: YELLOW — SIGNIFICANT CHANGE UP
DIFF PNL FLD: ABNORMAL
EPI CELLS # UR: ABNORMAL
GLUCOSE UR QL: NEGATIVE MG/DL — SIGNIFICANT CHANGE UP
HCT VFR BLD CALC: 34.7 % — SIGNIFICANT CHANGE UP (ref 34.5–45)
HGB BLD-MCNC: 11.5 G/DL — SIGNIFICANT CHANGE UP (ref 11.5–15.5)
KETONES UR-MCNC: ABNORMAL
LEUKOCYTE ESTERASE UR-ACNC: ABNORMAL
MCHC RBC-ENTMCNC: 26.9 PG — LOW (ref 27–34)
MCHC RBC-ENTMCNC: 33.1 GM/DL — SIGNIFICANT CHANGE UP (ref 32–36)
MCV RBC AUTO: 81.1 FL — SIGNIFICANT CHANGE UP (ref 80–100)
NITRITE UR-MCNC: NEGATIVE — SIGNIFICANT CHANGE UP
PH UR: 8 — SIGNIFICANT CHANGE UP (ref 5–8)
PLATELET # BLD AUTO: 258 K/UL — SIGNIFICANT CHANGE UP (ref 150–400)
PROT UR-MCNC: 15 MG/DL
RBC # BLD: 4.28 M/UL — SIGNIFICANT CHANGE UP (ref 3.8–5.2)
RBC # FLD: 15.5 % — HIGH (ref 10.3–14.5)
RBC CASTS # UR COMP ASSIST: ABNORMAL /HPF (ref 0–4)
SP GR SPEC: 1.01 — SIGNIFICANT CHANGE UP (ref 1.01–1.02)
UROBILINOGEN FLD QL: NEGATIVE MG/DL — SIGNIFICANT CHANGE UP
WBC # BLD: 11.92 K/UL — HIGH (ref 3.8–10.5)
WBC # FLD AUTO: 11.92 K/UL — HIGH (ref 3.8–10.5)
WBC UR QL: SIGNIFICANT CHANGE UP

## 2021-08-30 PROCEDURE — 59025 FETAL NON-STRESS TEST: CPT | Mod: 26

## 2021-08-30 PROCEDURE — 87086 URINE CULTURE/COLONY COUNT: CPT

## 2021-08-30 PROCEDURE — 59025 FETAL NON-STRESS TEST: CPT

## 2021-08-30 PROCEDURE — 76815 OB US LIMITED FETUS(S): CPT

## 2021-08-30 PROCEDURE — 87077 CULTURE AEROBIC IDENTIFY: CPT

## 2021-08-30 PROCEDURE — 36415 COLL VENOUS BLD VENIPUNCTURE: CPT

## 2021-08-30 PROCEDURE — G0463: CPT

## 2021-08-30 PROCEDURE — 99214 OFFICE O/P EST MOD 30 MIN: CPT | Mod: GC,25

## 2021-08-30 PROCEDURE — 81001 URINALYSIS AUTO W/SCOPE: CPT

## 2021-08-30 PROCEDURE — 85027 COMPLETE CBC AUTOMATED: CPT

## 2021-08-30 RX ORDER — ACETAMINOPHEN 500 MG
975 TABLET ORAL ONCE
Refills: 0 | Status: COMPLETED | OUTPATIENT
Start: 2021-08-30 | End: 2021-08-30

## 2021-08-30 RX ORDER — SODIUM CHLORIDE 9 MG/ML
500 INJECTION, SOLUTION INTRAVENOUS ONCE
Refills: 0 | Status: COMPLETED | OUTPATIENT
Start: 2021-08-30 | End: 2021-08-30

## 2021-08-30 RX ADMIN — SODIUM CHLORIDE 1000 MILLILITER(S): 9 INJECTION, SOLUTION INTRAVENOUS at 16:10

## 2021-08-30 NOTE — OB RN TRIAGE NOTE - PATIENT ON (OXYGEN DELIVERY METHOD)
Subjective Hello in bed no acute distress    VITAL SIGNS    Telemetry: Sinus bradycardia  45-60    Vital Signs Last 24 Hrs  T(C): 36.8 (17 @ 06:52), Max: 37.1 (17 @ 19:04)  T(F): 98.2 (17 @ 06:52), Max: 98.7 (17 @ 19:04)  HR: 51 (17 @ 09:55) (44 - 62)  BP: 175/75 (17 @ 09:55) (150/66 - 205/75)  RR: 17 (17 @ 06:52) (16 - 18)  SpO2: 95% (17 @ 06:52) (95% - 98%)            @ 07:01  -   @ 07:00  --------------------------------------------------------  IN: 536 mL / OUT: 420 mL / NET: 116 mL     @ 07:01  -   @ 11:35  --------------------------------------------------------  IN: 240 mL / OUT: 0 mL / NET: 240 mL    Daily Weight in k.4 (30 Sep 2017 08:41)  MEDICATIONS  (STANDING):  sodium chloride 0.9% lock flush 3 milliLiter(s) IV Push every 8 hours  aspirin  chewable 81 milliGRAM(s) Oral daily  metoprolol succinate  milliGRAM(s) Oral daily  atorvastatin 80 milliGRAM(s) Oral at bedtime  heparin  Infusion.  Unit(s)/Hr (8 mL/Hr) IV Continuous <Continuous>  pantoprazole    Tablet 40 milliGRAM(s) Oral before breakfast  oxybutynin XL 15 milliGRAM(s) Oral at bedtime  influenza   Vaccine 0.5 milliLiter(s) IntraMuscular once  amLODIPine   Tablet 5 milliGRAM(s) Oral daily  gabapentin 200 milliGRAM(s) Oral daily    MEDICATIONS  (PRN):  heparin  Injectable 4100 Unit(s) IV Push every 6 hours PRN For aPTT less than 4    PHYSICAL EXAM  Neurology: alert and oriented x 3, nonfocal, no gross deficits  CV : S1 S2RR  Lungs:  Abdomen: soft, nontender, nondistended, positive bowel sounds, l   :     voiding        Extremities:  B/L + DP negative eedema    Discussed with Cardiothoracic Team at AM rounds. Subjective Hello in bed no acute distress    VITAL SIGNS    Telemetry: Sinus bradycardia  45-60    Vital Signs Last 24 Hrs  T(C): 36.8 (17 @ 06:52), Max: 37.1 (17 @ 19:04)  T(F): 98.2 (17 @ 06:52), Max: 98.7 (17 @ 19:04)  HR: 51 (17 @ 09:55) (44 - 62)  BP: 175/75 (17 @ 09:55) (150/66 - 205/75)  RR: 17 (17 @ 06:52) (16 - 18)  SpO2: 95% (17 @ 06:52) (95% - 98%)            @ 07:01  -   @ 07:00  --------------------------------------------------------  IN: 536 mL / OUT: 420 mL / NET: 116 mL     @ 07:01  -   @ 11:35  --------------------------------------------------------  IN: 240 mL / OUT: 0 mL / NET: 240 mL    Daily Weight in k.4 (30 Sep 2017 08:41)  MEDICATIONS  (STANDING):  sodium chloride 0.9% lock flush 3 milliLiter(s) IV Push every 8 hours  aspirin  chewable 81 milliGRAM(s) Oral daily  metoprolol succinate  milliGRAM(s) Oral daily  atorvastatin 80 milliGRAM(s) Oral at bedtime  heparin  Infusion.  Unit(s)/Hr (8 mL/Hr) IV Continuous <Continuous>  pantoprazole    Tablet 40 milliGRAM(s) Oral before breakfast  oxybutynin XL 15 milliGRAM(s) Oral at bedtime  influenza   Vaccine 0.5 milliLiter(s) IntraMuscular once  amLODIPine   Tablet 5 milliGRAM(s) Oral daily  gabapentin 200 milliGRAM(s) Oral daily    MEDICATIONS  (PRN):  heparin  Injectable 4100 Unit(s) IV Push every 6 hours PRN For aPTT less than 4    PHYSICAL EXAM  Neurology: alert and oriented x 3, nonfocal, no gross deficits  CV : S1 S2RR  Lungs:  left mastectomy  Abdomen: soft, nontender, nondistended, positive bowel sounds, l   :     voiding        Extremities:  B/L + DP negative eedema    Discussed with Cardiothoracic Team at AM rounds. Subjective Hello in bed no acute distress    VITAL SIGNS    Telemetry: Sinus bradycardia  45-60    Vital Signs Last 24 Hrs  T(C): 36.8 (17 @ 06:52), Max: 37.1 (17 @ 19:04)  T(F): 98.2 (17 @ 06:52), Max: 98.7 (17 @ 19:04)  HR: 51 (17 @ 09:55) (44 - 62)  BP: 175/75 (17 @ 09:55) (150/66 - 205/75)  RR: 17 (17 @ 06:52) (16 - 18)  SpO2: 95% (17 @ 06:52) (95% - 98%)            @ 07:01  -   @ 07:00  --------------------------------------------------------  IN: 536 mL / OUT: 420 mL / NET: 116 mL     @ 07:01  -   @ 11:35  --------------------------------------------------------  IN: 240 mL / OUT: 0 mL / NET: 240 mL    Daily Weight in k.4 (30 Sep 2017 08:41)  MEDICATIONS  (STANDING):  sodium chloride 0.9% lock flush 3 milliLiter(s) IV Push every 8 hours  aspirin  chewable 81 milliGRAM(s) Oral daily  metoprolol succinate  milliGRAM(s) Oral daily  atorvastatin 80 milliGRAM(s) Oral at bedtime  heparin  Infusion.  Unit(s)/Hr (8 mL/Hr) IV Continuous <Continuous>  pantoprazole    Tablet 40 milliGRAM(s) Oral before breakfast  oxybutynin XL 15 milliGRAM(s) Oral at bedtime  influenza   Vaccine 0.5 milliLiter(s) IntraMuscular once  amLODIPine   Tablet 5 milliGRAM(s) Oral daily  gabapentin 200 milliGRAM(s) Oral daily    MEDICATIONS  (PRN):  heparin  Injectable 4100 Unit(s) IV Push every 6 hours PRN For aPTT less than 4    PHYSICAL EXAM  Neurology: alert and oriented x 3, nonfocal, no gross deficits  CV : S1 S2RR  Lungs: crackles left base breath sounds throughout  left mastectomy  Abdomen: soft, nontender, nondistended, positive bowel sounds, l   :     voiding        Extremities:  B/L + DP negative edema    Discussed with Cardiothoracic Team at AM rounds. room air

## 2021-08-30 NOTE — OB RN TRIAGE NOTE - NS_PRENATALHARD_OBGYN_ALL_OB
Will start Cymbalta 20 mg daily  Continue BuSpar 10 mg 3 times a day  Continue Ativan 1 5 mg at bedtime as needed  Not Available

## 2021-08-30 NOTE — OB PROVIDER TRIAGE NOTE - NSOBPROVIDERNOTE_OBGYN_ALL_OB_FT
Ms. Aguirre is a 26 yo  at 24w1d (SERG 21) dated by 10w sono who presents to L&D with sharp lower pelvic pain and lightheadedness intermittently throughout her pregnancy. Ob hx significant for LTCS x3 with hx of pre-eclampsia that was diagnosed after a syncopal episode. Maternal and fetal status reassuring at this time.     1. Sharp pain   -likely ligament pain 2/2 dehydration  -Started on LR    2. Lightheadedness  -Started on LR  -CBC to r/o significant anemia  -Offered tylenol for headache, patient denied   -UA pending      Discussed with  _ Ms. Aguirre is a 26 yo  at 24w1d (SERG 21) dated by 10w sono who presents to L&D with sharp lower pelvic pain and lightheadedness intermittently throughout her pregnancy. Ob hx significant for LTCS x3 with hx of pre-eclampsia that was diagnosed after a syncopal episode during her last pregnancy. Maternal and fetal status reassuring at this time.     1. Sharp pain   - Likely ligament pain 2/2 dehydration  - Started on LR for hydration  - Encouraged increase PO fluid intake    2. Lightheadedness  -Started on LR  -CBC to r/o significant anemia. Hb 11.5  -Offered Tylenol for headache, patient denied   -UA with small ketones, specific gravity 1.010      Discussed with Dr. Wing Ms. Aguirre is a 24 yo  at 24w1d (SERG 21) dated by 10w sono who presents to L&D with sharp lower pelvic pain and lightheadedness intermittently throughout her pregnancy. Ob hx significant for LTCS x3 with hx of pre-eclampsia that was diagnosed after a syncopal episode during her last pregnancy. Maternal and fetal status reassuring at this time.     1. Sharp pelvic pain   - Likely ligament pain 2/2 dehydration  - s/p 500cc LR bolus for hydration  - Encouraged increase PO fluid intake  - BP normotensive    2. Lightheadedness  -Started on LR, thought secondary to dehydration  -CBC to r/o significant anemia. Hb 11.5  -Offered Tylenol for headache, patient declined   -UA with small ketones, specific gravity 1.010 suggestive of dehydration  -UCx sent due to trace leukocyte esterase; will follow up and call patient if abnormal    -follow up: has appt within 2 weeks; encouraged to keep  -dispo: safe for discharge home with return precautions      Discussed with Dr. Larios

## 2021-08-30 NOTE — OB PROVIDER TRIAGE NOTE - HISTORY OF PRESENT ILLNESS
Ms. Aguirre is a 24 yo  at 24w1d (SERG 21) dated by 10w sono who presents to L&D with sharp lower pelvic pain and lightheadedness. She has experienced both these symptoms intermittently throughout her pregnancy. The sharp pains are shooting in quality and occur both at rest and with activity. The lightheadedness occurs when she showers and bends down and is sometimes accompanied by a headache, nausea, and dry heaves. She has not taken any medications for these symptoms. She endorses good fetal movement, denies ctx, vaginal bleeding, or LOF. She is experiencing white creamy discharge but denies any fevers, chills, dysuria or vaginal irritation. Of note, she was diagnosed with a left ovarian cyst at ~6w during this pregnancy and recent follow-up showed a decrease in size of the cyst. She had N/V early in pregnancy that has since resolved. Her pregnancy is otherwise uncomplicated.     ObHx:  G1 (): SAB @10w  G2 (): SAB @10w  G3 (9/15/15): term @39w, C/s 2/2 prolonged labor   G4 (18): term @40w, C/s 2/2 non-reassuring FHT  G5 (4/15/19) term scheduled C/s 2/2 pre-eclampsia. Mom was on bed-rest for final 2 months of pregnancy.   G6 (): TOP s/p D&C    GynHx:  Chlamydia (), trichomonas ()  Normal pap-smear 2021, follows with Dr. Alvarado    PMHx:  Hx of low BP, anemia (not on meds), recently dx left ovarian cyst    PSH: C/s x3    SH: Denies alcohol, tobacco and illicit drug use during this pregnancy. Lives with her mother and 3 kids. 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 Has 1 dog     Meds: PNVs, baby aspirin (alternates between 1 and 2 pills every other day)    Allergies: Bee sting, hx of anaphylaxis     Blood transfusions: none   Ms. Aguirre is a 24 yo  at 24w1d (SERG 21) dated by 10w sono who presents to L&D with sharp lower pelvic pain and lightheadedness. She has experienced both these symptoms intermittently throughout her pregnancy. The sharp pains are shooting in quality and occur both at rest and with activity. The lightheadedness occurs when she showers and bends down and is sometimes accompanied by a headache, nausea, and dry heaves. She has not taken any medications for these symptoms. She notes that she has been hydrating with cranberry juice. She endorses good fetal movement, denies ctx, vaginal bleeding, or LOF. She is experiencing white creamy discharge but denies any fevers, chills, dysuria or vaginal irritation. Of note, she was diagnosed with a left ovarian cyst at ~6w during this pregnancy and recent follow-up showed a decrease in size of the cyst. She had N/V early in pregnancy that has since resolved. Her pregnancy is otherwise uncomplicated.     ObHx:  G1 (): SAB @10w  G2 (): SAB @10w  G3 (9/15/15): term @39w, C/s 2/2 prolonged labor   G4 (18): term @40w, C/s 2/2 non-reassuring FHT  G5 (4/15/19) term scheduled C/s 2/2 pre-eclampsia. Mom was on bed-rest for final 2 months of pregnancy.   G6 (): TOP s/p D&C    GynHx:  Chlamydia (), trichomonas ()  Normal pap-smear 2021, follows with Dr. Boyd    PMHx:  Hx of low BP, anemia (not on meds), recently dx left ovarian cyst    PSH: C/s x3    SH: Denies alcohol, tobacco and illicit drug use during this pregnancy. Lives with her mother and 3 kids. 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 Has 1 dog.      Meds: PNVs, baby aspirin (alternates between 1 and 2 pills every other day)    Allergies: Bee sting, hx of anaphylaxis     Blood transfusions: none

## 2021-08-30 NOTE — OB PROVIDER TRIAGE NOTE - NSHPPHYSICALEXAM_GEN_ALL_CORE
HR: 68 (08-30-21 @ 15:10) (68 - 68)  BP: 104/59 (08-30-21 @ 15:10) (104/59 - 104/59)    Gen: NAD, well-appearing, AAOx3   Abd: Soft, gravid  Ext: non-tender, non-edematous  Heart: RRR, S1 and S2 noted  Lungs: CTAB, non-labored respirations    FHT: baseline 140  [place]: acontractile HR: 68 (08-30-21 @ 15:10) (68 - 68)  BP: 104/59 (08-30-21 @ 15:10) (104/59 - 104/59)    Gen: NAD, well-appearing, AAOx3   Abd: Soft, gravid  Ext: non-tender, non-edematous  Heart: RRR, S1 and S2 noted  Lungs: CTAB, non-labored respirations    FHT: baseline 140, mod variability, +accels, no decels  [place]: no contractions  SVE: 0/0/-3, soft, anterior

## 2021-09-01 LAB
CULTURE RESULTS: SIGNIFICANT CHANGE UP
SPECIMEN SOURCE: SIGNIFICANT CHANGE UP

## 2021-09-08 ENCOUNTER — EMERGENCY (EMERGENCY)
Facility: HOSPITAL | Age: 26
LOS: 1 days | Discharge: HOME | End: 2021-09-08
Attending: STUDENT IN AN ORGANIZED HEALTH CARE EDUCATION/TRAINING PROGRAM
Payer: COMMERCIAL

## 2021-09-08 VITALS
OXYGEN SATURATION: 97 % | SYSTOLIC BLOOD PRESSURE: 86 MMHG | RESPIRATION RATE: 20 BRPM | DIASTOLIC BLOOD PRESSURE: 51 MMHG | WEIGHT: 136.03 LBS | TEMPERATURE: 100 F | HEART RATE: 109 BPM | HEIGHT: 61 IN

## 2021-09-08 DIAGNOSIS — Z98.891 HISTORY OF UTERINE SCAR FROM PREVIOUS SURGERY: Chronic | ICD-10-CM

## 2021-09-08 LAB
ALBUMIN SERPL ELPH-MCNC: 3.5 G/DL — SIGNIFICANT CHANGE UP (ref 3.3–5.2)
ALP SERPL-CCNC: 80 U/L — SIGNIFICANT CHANGE UP (ref 40–120)
ALT FLD-CCNC: 10 U/L — SIGNIFICANT CHANGE UP
ANION GAP SERPL CALC-SCNC: 14 MMOL/L — SIGNIFICANT CHANGE UP (ref 5–17)
APTT BLD: 27 SEC — LOW (ref 27.5–35.5)
AST SERPL-CCNC: 17 U/L — SIGNIFICANT CHANGE UP
BASOPHILS # BLD AUTO: 0.03 K/UL — SIGNIFICANT CHANGE UP (ref 0–0.2)
BASOPHILS NFR BLD AUTO: 0.4 % — SIGNIFICANT CHANGE UP (ref 0–2)
BILIRUB SERPL-MCNC: 0.2 MG/DL — LOW (ref 0.4–2)
BUN SERPL-MCNC: 5 MG/DL — LOW (ref 8–20)
CALCIUM SERPL-MCNC: 8.3 MG/DL — LOW (ref 8.6–10.2)
CHLORIDE SERPL-SCNC: 102 MMOL/L — SIGNIFICANT CHANGE UP (ref 98–107)
CO2 SERPL-SCNC: 19 MMOL/L — LOW (ref 22–29)
CREAT SERPL-MCNC: 0.48 MG/DL — LOW (ref 0.5–1.3)
EOSINOPHIL # BLD AUTO: 0.01 K/UL — SIGNIFICANT CHANGE UP (ref 0–0.5)
EOSINOPHIL NFR BLD AUTO: 0.1 % — SIGNIFICANT CHANGE UP (ref 0–6)
GLUCOSE SERPL-MCNC: 110 MG/DL — HIGH (ref 70–99)
HCT VFR BLD CALC: 32.3 % — LOW (ref 34.5–45)
HGB BLD-MCNC: 10.7 G/DL — LOW (ref 11.5–15.5)
IMM GRANULOCYTES NFR BLD AUTO: 0.8 % — SIGNIFICANT CHANGE UP (ref 0–1.5)
INR BLD: 1.05 RATIO — SIGNIFICANT CHANGE UP (ref 0.88–1.16)
LACTATE BLDV-MCNC: 1.5 MMOL/L — SIGNIFICANT CHANGE UP (ref 0.5–2)
LYMPHOCYTES # BLD AUTO: 0.93 K/UL — LOW (ref 1–3.3)
LYMPHOCYTES # BLD AUTO: 11.1 % — LOW (ref 13–44)
MCHC RBC-ENTMCNC: 26.4 PG — LOW (ref 27–34)
MCHC RBC-ENTMCNC: 33.1 GM/DL — SIGNIFICANT CHANGE UP (ref 32–36)
MCV RBC AUTO: 79.6 FL — LOW (ref 80–100)
MONOCYTES # BLD AUTO: 1.06 K/UL — HIGH (ref 0–0.9)
MONOCYTES NFR BLD AUTO: 12.6 % — SIGNIFICANT CHANGE UP (ref 2–14)
NEUTROPHILS # BLD AUTO: 6.28 K/UL — SIGNIFICANT CHANGE UP (ref 1.8–7.4)
NEUTROPHILS NFR BLD AUTO: 75 % — SIGNIFICANT CHANGE UP (ref 43–77)
PLATELET # BLD AUTO: 233 K/UL — SIGNIFICANT CHANGE UP (ref 150–400)
POTASSIUM SERPL-MCNC: 3 MMOL/L — LOW (ref 3.5–5.3)
POTASSIUM SERPL-SCNC: 3 MMOL/L — LOW (ref 3.5–5.3)
PROT SERPL-MCNC: 6.6 G/DL — SIGNIFICANT CHANGE UP (ref 6.6–8.7)
PROTHROM AB SERPL-ACNC: 12.2 SEC — SIGNIFICANT CHANGE UP (ref 10.6–13.6)
RBC # BLD: 4.06 M/UL — SIGNIFICANT CHANGE UP (ref 3.8–5.2)
RBC # FLD: 15.3 % — HIGH (ref 10.3–14.5)
SODIUM SERPL-SCNC: 135 MMOL/L — SIGNIFICANT CHANGE UP (ref 135–145)
WBC # BLD: 8.38 K/UL — SIGNIFICANT CHANGE UP (ref 3.8–10.5)
WBC # FLD AUTO: 8.38 K/UL — SIGNIFICANT CHANGE UP (ref 3.8–10.5)

## 2021-09-08 PROCEDURE — 99213 OFFICE O/P EST LOW 20 MIN: CPT

## 2021-09-08 PROCEDURE — 99285 EMERGENCY DEPT VISIT HI MDM: CPT

## 2021-09-08 PROCEDURE — 93010 ELECTROCARDIOGRAM REPORT: CPT

## 2021-09-08 RX ORDER — SODIUM CHLORIDE 9 MG/ML
1900 INJECTION, SOLUTION INTRAVENOUS ONCE
Refills: 0 | Status: COMPLETED | OUTPATIENT
Start: 2021-09-08 | End: 2021-09-08

## 2021-09-08 RX ADMIN — SODIUM CHLORIDE 1900 MILLILITER(S): 9 INJECTION, SOLUTION INTRAVENOUS at 22:18

## 2021-09-08 NOTE — ED PROVIDER NOTE - ATTENDING CONTRIBUTION TO CARE
I performed a face to face history and physical exam of the patient and discussed their management with the student/resident/ACP. I reviewed the student/resident/ACP's note and agree with the documented findings and plan of care.    Pt states that early this morning she developed cough, nasal congestion and fever. Pt is 26 wks pregnant. Pt went to see her OB and was sent to the ER. no other complaints.    physical - rrr. ctab. abd- soft, nt. no edema. no rash.    plan - labs and imaging reviewed. Pt covid positive. Pt reassured and instructed to take tylenol. instructed to return for worsening sob or any other concerns. Pt cleared by OB.

## 2021-09-08 NOTE — ED PROVIDER NOTE - NSFOLLOWUPINSTRUCTIONS_ED_ALL_ED_FT
- Please follow-up with your OB doctor.  - Return to the emergency room for increased trouble breathing, chest pain, or other new issues.  - May take tylenol as needed for fever.  No ibuprofen.

## 2021-09-08 NOTE — ED PROVIDER NOTE - PHYSICAL EXAMINATION
Constitutional: Awake, alert, in mild distress  Eyes: no scleral icterus  HENT: normocephalic, atraumatic, moist oral mucosa  Neck: supple  CV: RRR, no murmur  Pulm: non-labored respirations, no wheezing or crackles, +mildly diminished breath sounds at bases  Abdomen: soft, non-tender, non-distended, +gravid  Extremities: no edema, no deformity  Skin: no rash, no jaundice  Neuro: AAOx3, moving all extremities equally Constitutional: Awake, alert, in mild distress, speaking in full sentences  Eyes: no scleral icterus  HENT: normocephalic, atraumatic, moist oral mucosa  Neck: supple  CV: RRR, no murmur  Pulm: non-labored respirations, no wheezing or crackles  Abdomen: soft, non-tender, non-distended, +gravid  Extremities: no edema, no deformity  Skin: no rash, no jaundice  Neuro: AAOx3, moving all extremities equally

## 2021-09-08 NOTE — ED ADULT TRIAGE NOTE - CHIEF COMPLAINT QUOTE
C/o fever, generalized weakness, and body aches x1 day. +Chest congestion and cough. Highest temp 104 orally, took Tylenol prior to arrival. Pt states she is 26 weeks pregnant. Denies abdominal pain, cramping, vaginal bleeding. Hx of anemia. Pt brought to cc.

## 2021-09-08 NOTE — ED PROVIDER NOTE - OBJECTIVE STATEMENT
25y F w/ hx anemia, currently 26 weeks pregnant, , presenting for fever.  Pt reports onset at 3 AM today of fever of 104, associated with cough.  Complains of difficulty breathing and b/l lower chest discomfort when she coughs.  Took tylenol prior to arrival.  No vomiting, diarrhea, abdominal pain, urinary complaints, vaginal bleeding.  Pt is not vaccinated for COVID.

## 2021-09-08 NOTE — ED PROVIDER NOTE - CLINICAL SUMMARY MEDICAL DECISION MAKING FREE TEXT BOX
25y F, currently 26 weeks pregnant, presenting for fever x 1 day.  Likely viral illness.  Pt mildly tachycardic and hypotensive on arrival.  Will give IV fluids, check CXR, EKG, labs, RVP, reassess.

## 2021-09-08 NOTE — ED PROVIDER NOTE - PROGRESS NOTE DETAILS
Danny: Pt with improved VS.  Refusing CXR. Danny: Pt stable.  Spoke to OB, who will evaluate pt and request sono. Danny: Pt cleared by OB.  Stable for discharge with return precautions.

## 2021-09-08 NOTE — ED PROVIDER NOTE - NS ED ROS FT
Constitutional: +fever  Eyes: no vision changes  ENT: no nasal congestion, no sore throat  CV: +chest pain  Resp: +cough, +shortness of breath  GI: no abdominal pain, no vomiting, no diarrhea  : no dysuria  MSK: no joint pain  Skin: no rash  Neuro: no headache, no weakness, no paresthesias

## 2021-09-08 NOTE — ED PROVIDER NOTE - PATIENT PORTAL LINK FT
You can access the FollowMyHealth Patient Portal offered by [facility] by registering at the following website: [email or web address]. By joining [employer]’s FollowMyHealth portal, you will also be able to view your health information using other applications (apps) compatible with our system.

## 2021-09-08 NOTE — ED ADULT NURSE NOTE - OBJECTIVE STATEMENT
A&Ox4, RR even and unlabored skin is warm and dry.  PT coming to ED C/O of fever, cough and SOB for one day. Chest pain when laying flat. +dry non productive cough. Temp of 100.4 at home, took Tylenol prior to arrival top ED.  PT is currently 26 weeks pregnant.  not vaccinated.  Denies recent sick contacts or travel. hx of anemia.

## 2021-09-09 VITALS
RESPIRATION RATE: 18 BRPM | OXYGEN SATURATION: 99 % | SYSTOLIC BLOOD PRESSURE: 104 MMHG | DIASTOLIC BLOOD PRESSURE: 63 MMHG | TEMPERATURE: 99 F | HEART RATE: 93 BPM

## 2021-09-09 LAB
APPEARANCE UR: CLEAR — SIGNIFICANT CHANGE UP
B PERT DNA SPEC QL NAA+PROBE: SIGNIFICANT CHANGE UP
BILIRUB UR-MCNC: NEGATIVE — SIGNIFICANT CHANGE UP
C PNEUM DNA SPEC QL NAA+PROBE: SIGNIFICANT CHANGE UP
COLOR SPEC: YELLOW — SIGNIFICANT CHANGE UP
DIFF PNL FLD: ABNORMAL
EPI CELLS # UR: SIGNIFICANT CHANGE UP
FLUAV H1 2009 PAND RNA SPEC QL NAA+PROBE: SIGNIFICANT CHANGE UP
FLUAV H1 RNA SPEC QL NAA+PROBE: SIGNIFICANT CHANGE UP
FLUAV H3 RNA SPEC QL NAA+PROBE: SIGNIFICANT CHANGE UP
FLUAV SUBTYP SPEC NAA+PROBE: SIGNIFICANT CHANGE UP
FLUBV RNA SPEC QL NAA+PROBE: SIGNIFICANT CHANGE UP
GLUCOSE UR QL: NEGATIVE MG/DL — SIGNIFICANT CHANGE UP
HADV DNA SPEC QL NAA+PROBE: SIGNIFICANT CHANGE UP
HCOV PNL SPEC NAA+PROBE: SIGNIFICANT CHANGE UP
HMPV RNA SPEC QL NAA+PROBE: SIGNIFICANT CHANGE UP
HPIV1 RNA SPEC QL NAA+PROBE: SIGNIFICANT CHANGE UP
HPIV2 RNA SPEC QL NAA+PROBE: SIGNIFICANT CHANGE UP
HPIV3 RNA SPEC QL NAA+PROBE: SIGNIFICANT CHANGE UP
HPIV4 RNA SPEC QL NAA+PROBE: SIGNIFICANT CHANGE UP
KETONES UR-MCNC: NEGATIVE — SIGNIFICANT CHANGE UP
LEUKOCYTE ESTERASE UR-ACNC: ABNORMAL
NITRITE UR-MCNC: NEGATIVE — SIGNIFICANT CHANGE UP
PH UR: 7 — SIGNIFICANT CHANGE UP (ref 5–8)
PROT UR-MCNC: 15 MG/DL
RAPID RVP RESULT: DETECTED
RBC CASTS # UR COMP ASSIST: ABNORMAL /HPF (ref 0–4)
RV+EV RNA SPEC QL NAA+PROBE: SIGNIFICANT CHANGE UP
SARS-COV-2 RNA SPEC QL NAA+PROBE: DETECTED
SP GR SPEC: 1.01 — SIGNIFICANT CHANGE UP (ref 1.01–1.02)
UROBILINOGEN FLD QL: NEGATIVE MG/DL — SIGNIFICANT CHANGE UP
WBC UR QL: SIGNIFICANT CHANGE UP

## 2021-09-09 PROCEDURE — 81001 URINALYSIS AUTO W/SCOPE: CPT

## 2021-09-09 PROCEDURE — 76815 OB US LIMITED FETUS(S): CPT | Mod: 26

## 2021-09-09 PROCEDURE — 80053 COMPREHEN METABOLIC PANEL: CPT

## 2021-09-09 PROCEDURE — 85730 THROMBOPLASTIN TIME PARTIAL: CPT

## 2021-09-09 PROCEDURE — 93005 ELECTROCARDIOGRAM TRACING: CPT

## 2021-09-09 PROCEDURE — 85610 PROTHROMBIN TIME: CPT

## 2021-09-09 PROCEDURE — 87086 URINE CULTURE/COLONY COUNT: CPT

## 2021-09-09 PROCEDURE — 76817 TRANSVAGINAL US OBSTETRIC: CPT

## 2021-09-09 PROCEDURE — 87040 BLOOD CULTURE FOR BACTERIA: CPT

## 2021-09-09 PROCEDURE — 99284 EMERGENCY DEPT VISIT MOD MDM: CPT | Mod: 25

## 2021-09-09 PROCEDURE — 36415 COLL VENOUS BLD VENIPUNCTURE: CPT

## 2021-09-09 PROCEDURE — 96360 HYDRATION IV INFUSION INIT: CPT

## 2021-09-09 PROCEDURE — 76815 OB US LIMITED FETUS(S): CPT

## 2021-09-09 PROCEDURE — 96361 HYDRATE IV INFUSION ADD-ON: CPT

## 2021-09-09 PROCEDURE — 0225U NFCT DS DNA&RNA 21 SARSCOV2: CPT

## 2021-09-09 PROCEDURE — 83605 ASSAY OF LACTIC ACID: CPT

## 2021-09-09 PROCEDURE — 85025 COMPLETE CBC W/AUTO DIFF WBC: CPT

## 2021-09-09 PROCEDURE — 76817 TRANSVAGINAL US OBSTETRIC: CPT | Mod: 26

## 2021-09-09 RX ORDER — BENZOCAINE AND MENTHOL 5; 1 G/100ML; G/100ML
1 LIQUID ORAL THREE TIMES A DAY
Refills: 0 | Status: DISCONTINUED | OUTPATIENT
Start: 2021-09-09 | End: 2021-09-13

## 2021-09-09 RX ORDER — POTASSIUM CHLORIDE 20 MEQ
40 PACKET (EA) ORAL ONCE
Refills: 0 | Status: COMPLETED | OUTPATIENT
Start: 2021-09-09 | End: 2021-09-09

## 2021-09-09 RX ADMIN — Medication 40 MILLIEQUIVALENT(S): at 02:00

## 2021-09-09 RX ADMIN — BENZOCAINE AND MENTHOL 1 LOZENGE: 5; 1 LIQUID ORAL at 03:38

## 2021-09-09 RX ADMIN — SODIUM CHLORIDE 1900 MILLILITER(S): 9 INJECTION, SOLUTION INTRAVENOUS at 00:37

## 2021-09-09 NOTE — CONSULT NOTE ADULT - SUBJECTIVE AND OBJECTIVE BOX
FELICIA ZHOU is a 25 y.o  @ 25w4d by LMP (3/14/21) who presents to ED with complaints of fevers, chills x 1 day.    Patient states that she woke up with morning and felt hot and clammy. Was concerned she had a fever and took a temperature orally that measured 104. She took 2 tabs of tylenol with minimal alleviateionof her symptoms or fever. Also experiencing loss of taste, intermittent chills, nonproductive cough and shortness of breath/chest discomfort that is worsened when laying flat and ambulating. She denies any recent sick contacts, or travel. Has a child who is attending school, but uncertain if they have had any exposures. Otherwise, reports good fetal movement. Denies any LOF or vaginal bleeding. Denies any contraction like pain. Patient denies any headaches, anosmia or diarrhea.     Pregnancy course complicated by:  - Low SHAYNA-A (5th %-tile)  - History of C/S x 3    PMH  SUBJECTIVE:    PAST OBGYN HISTORY:  - pCS (2015) @ term - failed IOL  - rCS (2018) @ Term  - uncomplicated  - rCS (2019) @ Term - Uncomplicated  - eTOP x 1 (D&C)  - SAB x 1    PAST MEDICAL HISTORY:  Anemia      PAST SURGICAL HISTORY:  H/O:  X 3  D&C        Allergies:  No Known Drug Allergies  Originally Entered as [Unknown] reaction to [Bee/Wasp Stings] (Unknown)    Meds:  PNV  Tylenol  ASA-81    FAMILY HISTORY:  No pertinent family history in first degree relatives        Social History: Denies ETOH, smoking and drugs.         REVIEW OF SYSTEMS:  AS PER HPI    Vital Signs Last 24 Hrs  T(C): 36.7 (09 Sep 2021 00:53), Max: 37.8 (08 Sep 2021 21:45)  T(F): 98 (09 Sep 2021 00:53), Max: 100 (08 Sep 2021 21:45)  HR: 100 (09 Sep 2021 00:53) (92 - 109)  BP: 112/61 (09 Sep 2021 00:53) (86/51 - 112/61)  BP(mean): --  RR: 16 (09 Sep 2021 00:53) (16 - 20)  SpO2: 99% (09 Sep 2021 00:53) (97% - 99%)  Height (cm): 154.9 (21 @ 21:45)  Weight (kg): 61.7 (21 @ 21:45)  BMI (kg/m2): 25.7 (21 @ 21:45)  BSA (m2): 1.6 (21 @ 21:45)    Physical Exam:  General: Adult female in minimal distress  CV: RRR  Lungs: CTAB  Abdomen: soft, non-tender, gravid uterus  FH: 140s  Pelvic: Deferred  Ext: No cyanosis, edema or calf tenderness  Skin: No rashes or lesions on exposed skin      Labs:                          10.7   8.38  )-----------( 233      ( 08 Sep 2021 22:19 )             32.3         135  |  102  |  5.0<L>  ----------------------------<  110<H>  3.0<L>   |  19.0<L>  |  0.48<L>    Ca    8.3<L>      08 Sep 2021 22:19    TPro  6.6  /  Alb  3.5  /  TBili  0.2<L>  /  DBili  x   /  AST  17  /  ALT  10  /  AlkPhos  80      PT/INR - ( 08 Sep 2021 22:19 )   PT: 12.2 sec;   INR: 1.05 ratio         PTT - ( 08 Sep 2021 22:19 )  PTT:27.0 sec        MEDICATIONS  (STANDING):  benzocaine 15 mG/menthol 3.6 mG (Sugar-Free) Lozenge 1 Lozenge Oral three times a day    MEDICATIONS  (PRN):        Radiology:  OB US PENDING FELICIA ZHOU is a 25 y.o  @ 25w4d by LMP (3/14/21) who presents to ED with complaints of fevers, chills x 1 day.    Patient states that she woke up with morning and felt hot and clammy. Was concerned she had a fever and took a temperature orally that measured 104. She took 2 tabs of tylenol with minimal alleviateionof her symptoms or fever. Also experiencing loss of taste, intermittent chills, nonproductive cough and shortness of breath/chest discomfort that is worsened when laying flat and ambulating. She denies any recent sick contacts, or travel. Has a child who is attending school, but uncertain if they have had any exposures. Otherwise, reports good fetal movement. Denies any LOF or vaginal bleeding. Denies any contraction like pain. Patient denies any headaches, anosmia or diarrhea.     Pregnancy course complicated by:  - Low SHAYNA-A (5th %-tile)  - History of C/S x 3    PMH  SUBJECTIVE:    PAST OBGYN HISTORY:  - pCS (2015) @ term - failed IOL  - rCS (2018) @ Term  - uncomplicated  - rCS (2019) @ Term - Uncomplicated  - eTOP x 1 (D&C)  - SAB x 1    PAST MEDICAL HISTORY:  Anemia      PAST SURGICAL HISTORY:  H/O:  X 3  D&C        Allergies:  No Known Drug Allergies  Originally Entered as [Unknown] reaction to [Bee/Wasp Stings] (Unknown)    Meds:  PNV  Tylenol  ASA-81    FAMILY HISTORY:  No pertinent family history in first degree relatives        Social History: Denies ETOH, smoking and drugs.         REVIEW OF SYSTEMS:  AS PER HPI    Vital Signs Last 24 Hrs  T(C): 36.7 (09 Sep 2021 00:53), Max: 37.8 (08 Sep 2021 21:45)  T(F): 98 (09 Sep 2021 00:53), Max: 100 (08 Sep 2021 21:45)  HR: 100 (09 Sep 2021 00:53) (92 - 109)  BP: 112/61 (09 Sep 2021 00:53) (86/51 - 112/61)  BP(mean): --  RR: 16 (09 Sep 2021 00:53) (16 - 20)  SpO2: 99% (09 Sep 2021 00:53) (97% - 99%)  Height (cm): 154.9 (21 @ 21:45)  Weight (kg): 61.7 (21 @ 21:45)  BMI (kg/m2): 25.7 (21 @ 21:45)  BSA (m2): 1.6 (21 @ 21:45)    Physical Exam:  General: Adult female in minimal distress  CV: RRR  Lungs: CTAB  Abdomen: soft, non-tender, gravid uterus  FH: 140s  Pelvic: Deferred  Ext: No cyanosis, edema or calf tenderness  Skin: No rashes or lesions on exposed skin      Labs:                          10.7   8.38  )-----------( 233      ( 08 Sep 2021 22:19 )             32.3         135  |  102  |  5.0<L>  ----------------------------<  110<H>  3.0<L>   |  19.0<L>  |  0.48<L>    Ca    8.3<L>      08 Sep 2021 22:19    TPro  6.6  /  Alb  3.5  /  TBili  0.2<L>  /  DBili  x   /  AST  17  /  ALT  10  /  AlkPhos  80      PT/INR - ( 08 Sep 2021 22:19 )   PT: 12.2 sec;   INR: 1.05 ratio         PTT - ( 08 Sep 2021 22:19 )  PTT:27.0 sec        MEDICATIONS  (STANDING):  benzocaine 15 mG/menthol 3.6 mG (Sugar-Free) Lozenge 1 Lozenge Oral three times a day    MEDICATIONS  (PRN):        Radiology:    < from: US Transvaginal, OB (21 @ 04:49) >     EXAM:  US OB LTD FETUS(ES)                         EXAM:  US OB TRANSVAGINAL                          PROCEDURE DATE:  2021          INTERPRETATION:  CLINICAL STATEMENT: Symptomatic COVID Check fetal well-being.    TECHNIQUE: Focused obstetrical ultrasound to evaluate fetal well-being.    COMPARISON: 2021.    FINDINGS:  Single live intrauterine gestation in cephalic presentation. Placenta is anterior. Cervix measures 4.3 cm.    Fetal heart rate measures 143 beats per minute.    Fetal biometry markers for BPD, HC, AC, and FL are concordant (+/-2 weeks), for an estimated gestational age of 26 weeks 0 days.    Estimated date of delivery is 2021.    Estimated fetal weight is 864 g.    JEREMY measures 14.6 cm.    IMPRESSION:  Single live intrauterine gestation.    --- End of Report ---            ALEXUS WEBER MD; Attending Radiologist  This document has been electronically signed. Sep  9 2021  5:03AM    < end of copied text >

## 2021-09-09 NOTE — CONSULT NOTE ADULT - ASSESSMENT
FELICIA ZHOU is a 25 y.o  @ 25w4d by LMP (3/14/21) who presents to ED with complaints of fevers, chills x 1 day; CODE Sepsis in ED 2/2 maternal tachycardia and hypotension-now resolved, and newly diagnosed COVID-19 infection      #COVID-19  - VSS on presentation: BPP 86/51, T-100, P-109, CODE sepsis initiated. Received IVF with improvement  - Sepsis labs drawn: H/H: 10.7/32.3  - COVID-19: Positive, Patient declined CXR   - UCX/BCX: Pending  - EKG: NSR, NO acute ischemic changes      #26weeks gestation  - FH @ bedside 140s w/ bedside doppler  - Reporting good fetal movement. Denies any CTX, LOF or vaginal bleeding  - Official OB US pending  - Please avoid NSAIDs in setting of pregnancy. Tylenol safe      Will continue to monitor    Plan d/w Dr. Jain FELICIA ZHOU is a 25 y.o  @ 25w4d by LMP (3/14/21) who presents to ED with complaints of fevers, chills x 1 day; CODE Sepsis in ED 2/2 maternal tachycardia and hypotension-now resolved, and newly diagnosed COVID-19 infection      #COVID-19  - VSS on presentation: BPP 86/51, T-100, P-109, CODE sepsis initiated. Received IVF with improvement  - Saturating > 98% on RA. Has not required supplemental O2 during hospital stay. No increased work of breathing noted.   - Sepsis labs drawn: H/H: 10.7/32.3  - COVID-19: Positive, Patient declined CXR   - UCX/BCX: Pending, EKG: NSR, NO acute ischemic changes      #26weeks gestation  - FH @ bedside 140s w/ bedside doppler  - Reporting good fetal movement. Denies any CTX, LOF or vaginal bleeding  - Official OB US pending  - Please avoid NSAIDs in setting of pregnancy. Tylenol safe      Will continue to monitor    Plan d/w Dr. Jain FELICIA ZHOU is a 25 y.o  @ 25w4d by LMP (3/14/21) who presents to ED with complaints of fevers, chills x 1 day; CODE Sepsis in ED 2/2 maternal tachycardia and hypotension-now resolved, and newly diagnosed COVID-19 infection      #COVID-19  - VSS on presentation: BPP 86/51, T-100, P-109, CODE sepsis initiated. Received IVF with improvement  - Saturating > 98% on RA. Has not required supplemental O2 during hospital stay. No increased work of breathing noted.   - Sepsis labs drawn: H/H: 10.7/32.3  - COVID-19: Positive, Patient declined CXR   - UCX/BCX: Pending, EKG: NSR, NO acute ischemic changes      #26weeks gestation  - FH @ bedside 140s w/ bedside doppler  - Reporting good fetal movement. Denies any CTX, LOF or vaginal bleeding  - Official OB US pending  - Please avoid NSAIDs in setting of pregnancy. Tylenol safe      Will continue to monitor    Plan d/w Dr. Jain    Addendum:    Subjective Hx, Physical Exam, Laboratory & Imaging results reviewed.  I agree with the Resident Physician's assessment and plan of care, as discussed above.  Call, follow up, and return to Hospital parameters reviewed at length.  She was given the opportunity to ask questions and all were addressed.      Channing Jain, DO   FELICIA ZHOU is a 25 y.o  @ 25w4d by LMP (3/14/21) who presents to ED with complaints of fevers, chills x 1 day; CODE Sepsis in ED 2/2 maternal tachycardia and hypotension-now resolved, and newly diagnosed COVID-19 infection      #COVID-19  - VSS on presentation: BPP 86/51, T-100, P-109, CODE sepsis initiated. Received IVF with improvement  - Saturating > 98% on RA. Has not required supplemental O2 during hospital stay. No increased work of breathing noted. Symptomatically feeling better  - Sepsis labs drawn: H/H: 10.7/32.3  - COVID-19: Positive, Patient declined CXR   - UCX/BCX: Pending, EKG: NSR, NO acute ischemic changes      #26weeks gestation  - FH @ bedside 140s w/ bedside doppler  - Reporting good fetal movement. Denies any CTX, LOF or vaginal bleeding  - Official OB US: FH 140s, Live IUP, CL-4.3. Cleared obstetrically. No signs of fetal distress on imaging.   - Please avoid NSAIDs in setting of pregnancy. Tylenol safe  - Patient to follow up with Dr. Johnson for routine follow up      Will continue to monitor    Plan d/w Dr. Jain    Addendum:    Subjective Hx, Physical Exam, Laboratory & Imaging results reviewed.  I agree with the Resident Physician's assessment and plan of care, as discussed above.  Call, follow up, and return to Hospital parameters reviewed at length.  She was given the opportunity to ask questions and all were addressed.      Channing Jain, DO

## 2021-09-09 NOTE — ED ADULT NURSE REASSESSMENT NOTE - NS ED NURSE REASSESS COMMENT FT1
Assumed care of pt at 23:45 from VALARIE Barrera. Charting as noted. Pt c.o generalized weakness and malaise. Currently receiving IVF. Pt placed on CM (NSR), VSS. No signs of acute distress noted.

## 2021-09-10 LAB
CULTURE RESULTS: SIGNIFICANT CHANGE UP
SPECIMEN SOURCE: SIGNIFICANT CHANGE UP

## 2021-10-08 NOTE — ED PROVIDER NOTE - EYES, MLM
Name band;
Clear bilaterally, pupils equal, round and reactive to light. EOMI. Visual fields intact x 4 quadrants.

## 2021-10-13 NOTE — OB RN TRIAGE NOTE - LIVES WITH, PROFILE
Initiate Treatment: HC 2.5% as directed (at pharmacy)\\nPDN\\nAggressive moisturizing with Vaseline or aquaphor Detail Level: Simple Plan: Will avoid 5FU and calcipotriene in the future.\\nPrednisone risks reviewed. Render In Strict Bullet Format?: No children/parents

## 2021-10-17 ENCOUNTER — OUTPATIENT (OUTPATIENT)
Dept: OUTPATIENT SERVICES | Facility: HOSPITAL | Age: 26
LOS: 1 days | End: 2021-10-17
Payer: COMMERCIAL

## 2021-10-17 VITALS
HEART RATE: 85 BPM | TEMPERATURE: 98 F | SYSTOLIC BLOOD PRESSURE: 101 MMHG | RESPIRATION RATE: 16 BRPM | DIASTOLIC BLOOD PRESSURE: 65 MMHG

## 2021-10-17 VITALS — HEART RATE: 104 BPM | DIASTOLIC BLOOD PRESSURE: 56 MMHG | SYSTOLIC BLOOD PRESSURE: 91 MMHG

## 2021-10-17 DIAGNOSIS — Z98.891 HISTORY OF UTERINE SCAR FROM PREVIOUS SURGERY: Chronic | ICD-10-CM

## 2021-10-17 DIAGNOSIS — O47.03 FALSE LABOR BEFORE 37 COMPLETED WEEKS OF GESTATION, THIRD TRIMESTER: ICD-10-CM

## 2021-10-17 PROBLEM — D64.9 ANEMIA, UNSPECIFIED: Chronic | Status: ACTIVE | Noted: 2021-09-08

## 2021-10-17 LAB
APPEARANCE UR: ABNORMAL
BILIRUB UR-MCNC: NEGATIVE — SIGNIFICANT CHANGE UP
COLOR SPEC: YELLOW — SIGNIFICANT CHANGE UP
DIFF PNL FLD: ABNORMAL
EPI CELLS # UR: SIGNIFICANT CHANGE UP
GLUCOSE UR QL: NEGATIVE MG/DL — SIGNIFICANT CHANGE UP
KETONES UR-MCNC: NEGATIVE — SIGNIFICANT CHANGE UP
LEUKOCYTE ESTERASE UR-ACNC: ABNORMAL
NITRITE UR-MCNC: NEGATIVE — SIGNIFICANT CHANGE UP
PH UR: 7 — SIGNIFICANT CHANGE UP (ref 5–8)
PROT UR-MCNC: 15 MG/DL
RBC CASTS # UR COMP ASSIST: ABNORMAL /HPF (ref 0–4)
SP GR SPEC: 1.01 — SIGNIFICANT CHANGE UP (ref 1.01–1.02)
UROBILINOGEN FLD QL: NEGATIVE MG/DL — SIGNIFICANT CHANGE UP
WBC UR QL: ABNORMAL

## 2021-10-17 PROCEDURE — 99214 OFFICE O/P EST MOD 30 MIN: CPT

## 2021-10-17 PROCEDURE — 59025 FETAL NON-STRESS TEST: CPT

## 2021-10-17 PROCEDURE — G0463: CPT

## 2021-10-17 PROCEDURE — 81001 URINALYSIS AUTO W/SCOPE: CPT

## 2021-10-17 RX ORDER — NITROFURANTOIN MACROCRYSTAL 50 MG
1 CAPSULE ORAL
Qty: 10 | Refills: 0
Start: 2021-10-17 | End: 2021-10-21

## 2021-10-17 NOTE — OB RN TRIAGE NOTE - NSICDXPASTMEDICALHX_GEN_ALL_CORE_FT
PAST MEDICAL HISTORY:  Anemia     Chlamydia 2019    H/O trichomoniasis 2020    Miscarriage 2014,2016    Preeclampsia 2019     PAST MEDICAL HISTORY:  2019 novel coronavirus disease (COVID-19) 9//9/2021    Anemia     Chlamydia 2019    H/O trichomoniasis 2020    Miscarriage 2014,2016    Preeclampsia 2019

## 2021-10-17 NOTE — OB PROVIDER TRIAGE NOTE - NSHPLABSRESULTS_GEN_ALL_CORE
Urinalysis Basic - ( 17 Oct 2021 07:49 )    Color: Yellow / Appearance: Slightly Turbid / S.010 / pH: x  Gluc: x / Ketone: Negative  / Bili: Negative / Urobili: Negative mg/dL   Blood: x / Protein: 15 mg/dL / Nitrite: Negative   Leuk Esterase: Small / RBC: 3-5 /HPF / WBC 6-10   Sq Epi: x / Non Sq Epi: Few / Bacteria: x

## 2021-10-17 NOTE — OB PROVIDER TRIAGE NOTE - NSICDXPASTMEDICALHX_GEN_ALL_CORE_FT
PAST MEDICAL HISTORY:  2019 novel coronavirus disease (COVID-19) 9//9/2021    Anemia     Chlamydia 2019    H/O trichomoniasis 2020    Miscarriage 2014,2016    Preeclampsia 2019

## 2021-10-17 NOTE — OB RN TRIAGE NOTE - CURRENT PREGNANCY COMPLICATIONS, OB PROFILE
Ongoing SW/CM Assessment/Plan of Care Note     See SW/CM flowsheets for goals and other objective data.    Patient/Family discharge goal (s):  Goal #1: Extended Care Facility discharge arranged  Goal #2: Transportation arranged or issues addressed       PT Recommendation:       OT Recommendation:  Recommendations for Discharge: OT: Less intensive rehab    SLP Recommendation:       Disposition:  Planned Discharge Destination: Rehabilitation/Skilled Care    Progress note:   WILLIAM spoke with Isabelle from [facility].  She indicated that they can likely accept patient, pending an approved financial ap.. Isabelle met with justus.. [employer]. has the financial ap and knows that it needs to be completed.  Isabelle will check back with WILLIAM on Friday.  WILLIAM will follow.         None

## 2021-10-17 NOTE — OB PROVIDER TRIAGE NOTE - NSOBPROVIDERNOTE_OBGYN_ALL_OB_FT
A/P: 26y  at 31 weeks GA who is being evaluated for rule out  labor.   -VSS  -SVE 0//-3  -Fetus: Reactive NST  -[place]: no contractions  -UA positive, medications sent to pharmacy     Dispo: Patient is stable for discharge to home with outpatient follow up. Differential diagnoses discussed with the patient. Return precautions given. Patient verbalized understanding and agreement with plan.     Discussed with Dr. Alvarado

## 2021-10-17 NOTE — OB PROVIDER TRIAGE NOTE - HISTORY OF PRESENT ILLNESS
FELICIA ZHOU is a 26y  at 31 weeks GA who presents to L&D for contractions. She endorses irregular contrctions since 0500, have not been increasing in frequency or intensity.  Pt denies vaginal bleeding and leakage of fluid. She endorses good fetal movement. She denies any urinary complaints. She denies fevers, chills, nausea, vomiting.   Pregnancy course is significant for:  1. PriorC/S x3  2. COVID in pregnancy   3. Anemia    POB:  G1 full term uncomplicated pC/S for labor arrest  G2   PGYN: -fibroids/-cysts, denied STD hx, denies abnormal PAPs  PMH:  PSH:  SH: Denies tobacco use, EtOH use and illicit drug use during the pregnancy; Feels safe at home  Meds:  All:    T(C): 36.7 (10-17-21 @ 06:40), Max: 36.7 (10-17-21 @ 06:17)  HR: 85 (10-17-21 @ 06:18) (85 - 85)  BP: 101/65 (10-17-21 @ 06:18) (101/65 - 101/65)  RR: 16 (10-17-21 @ 06:17) (16 - 16)  SpO2: --  Gen: NAD, well-appearing  Heart: RRR  Lungs: CTAB  Abd: soft, gravid  Ext: non-edematous, non-tender   SVE:   SSE: cervix visualized, closed and without any signs of bleeding or drainage, no pooling   FHT:   [place]:    A/P:     Fetus:  [place]:  Dispo: Continue to observe.     Discussed with    FELICIA ZHOU is a 26y  at 31 weeks GA who presents to L&D for contractions. She endorses irregular contrctions since 0500, have not been increasing in frequency or intensity.  Pt denies vaginal bleeding and leakage of fluid. She endorses good fetal movement. She denies any urinary complaints. She denies fevers, chills, nausea, vomiting.   Pregnancy course is significant for:  1. PriorC/S x3  2. COVID in pregnancy   3. Anemia    POB:  G1  full term uncomplicated pCS for labor arrest  G2 2018, full term uncomplicated rCS  PGYN: -fibroids/-cysts, denied STD hx, denies abnormal PAPs  PMH:  PSH:  SH: Denies tobacco use, EtOH use and illicit drug use during the pregnancy; Feels safe at home  Meds:  All:    T(C): 36.7 (10-17-21 @ 06:40), Max: 36.7 (10-17-21 @ 06:17)  HR: 85 (10-17-21 @ 06:18) (85 - 85)  BP: 101/65 (10-17-21 @ 06:18) (101/65 - 101/65)  RR: 16 (10-17-21 @ 06:17) (16 - 16)  SpO2: --  Gen: NAD, well-appearing  Heart: RRR  Lungs: CTAB  Abd: soft, gravid  Ext: non-edematous, non-tender   SVE:   SSE: cervix visualized, closed and without any signs of bleeding or drainage, no pooling   FHT:   [place]:    A/P:     Fetus:  [place]:  Dispo: Continue to observe.     Discussed with    FELICIA ZHOU is a 26y  at 31 weeks GA who presents to L&D for contractions. She endorses irregular contrctions since 0500, have not been increasing in frequency or intensity.  Pt denies vaginal bleeding and leakage of fluid. She endorses good fetal movement. She denies any urinary complaints. She denies fevers, chills, nausea, vomiting.   Pregnancy course is significant for:  1. PriorC/S x3  2. COVID in pregnancy   3. Anemia    POB:  G1 , SAB   G2  full term uncomplicated pCS for labor arrest  G3 , SAB   G4 2018, full term uncomplicated rCS  G5 2019, full term rCS with    PGYN: -fibroids/-cysts, denied STD hx, denies abnormal PAPs  PMH:  PSH:  SH: Denies tobacco use, EtOH use and illicit drug use during the pregnancy; Feels safe at home  Meds:  All:    T(C): 36.7 (10-17-21 @ 06:40), Max: 36.7 (10-17-21 @ 06:17)  HR: 85 (10-17-21 @ 06:18) (85 - 85)  BP: 101/65 (10-17-21 @ 06:18) (101/65 - 101/65)  RR: 16 (10-17-21 @ 06:17) (16 - 16)  SpO2: --  Gen: NAD, well-appearing  Heart: RRR  Lungs: CTAB  Abd: soft, gravid  Ext: non-edematous, non-tender   SVE:   SSE: cervix visualized, closed and without any signs of bleeding or drainage, no pooling   FHT:   [place]:    A/P:     Fetus:  [place]:  Dispo: Continue to observe.     Discussed with    FELICIA ZHOU is a 26y  at 31 weeks GA who presents to L&D for contractions. She endorses irregular contrctions since 0500, have not been increasing in frequency or intensity.  Pt denies vaginal bleeding and leakage of fluid. She endorses good fetal movement. She denies any urinary complaints. She denies fevers, chills, nausea, vomiting.   Pregnancy course is significant for:  1. PriorC/S x3  2. COVID in pregnancy   3. Anemia    POB:  G1 , SAB   G2 2015 full term uncomplicated pCS for labor arrest  G3 2016, SAB   G4 2018, full term uncomplicated rCS  G5 2019, full term rCS with    PGYN: -fibroids/-cysts, hx of STD, denies abnormal PAPs  PMH: COVID, anemia  PSH: C/S x3  SH: Denies tobacco use, EtOH use and illicit drug use during the pregnancy; Feels safe at home  Meds: PNVs  All: NKDA

## 2021-10-17 NOTE — OB PROVIDER TRIAGE NOTE - NSHPPHYSICALEXAM_GEN_ALL_CORE
T(C): 36.7 (10-17-21 @ 06:40), Max: 36.7 (10-17-21 @ 06:17)  HR: 85 (10-17-21 @ 06:18) (85 - 85)  BP: 101/65 (10-17-21 @ 06:18) (101/65 - 101/65)  RR: 16 (10-17-21 @ 06:17) (16 - 16)    Gen: NAD, well-appearing  Heart: RRR  Lungs: CTAB  Abd: soft, gravid  Ext: non-edematous, non-tender   SVE: 0/30/-3  SSE: cervix visualized, closed and without any signs of bleeding or drainage, no pooling   FHT: baseline 120s, moderate variability, +accels, -decels   [place]: no contractions

## 2021-10-21 NOTE — OB RN TRIAGE NOTE - NS_GESTAGE_OBGYN_ALL_OB_FT
24w1d Protopic Counseling: Patient may experience a mild burning sensation during topical application. Protopic is not approved in children less than 2 years of age. There have been case reports of hematologic and skin malignancies in patients using topical calcineurin inhibitors although causality is questionable.

## 2022-03-24 ENCOUNTER — EMERGENCY (EMERGENCY)
Facility: HOSPITAL | Age: 27
LOS: 1 days | Discharge: HOME | End: 2022-03-24
Attending: EMERGENCY MEDICINE
Payer: COMMERCIAL

## 2022-03-24 VITALS — RESPIRATION RATE: 18 BRPM | HEART RATE: 92 BPM | OXYGEN SATURATION: 99 %

## 2022-03-24 VITALS
RESPIRATION RATE: 22 BRPM | OXYGEN SATURATION: 98 % | SYSTOLIC BLOOD PRESSURE: 100 MMHG | WEIGHT: 147.05 LBS | HEART RATE: 133 BPM | HEIGHT: 61 IN | DIASTOLIC BLOOD PRESSURE: 61 MMHG | TEMPERATURE: 99 F

## 2022-03-24 DIAGNOSIS — Z98.891 HISTORY OF UTERINE SCAR FROM PREVIOUS SURGERY: Chronic | ICD-10-CM

## 2022-03-24 PROBLEM — A74.9 CHLAMYDIAL INFECTION, UNSPECIFIED: Chronic | Status: ACTIVE | Noted: 2021-10-17

## 2022-03-24 PROBLEM — U07.1 COVID-19: Chronic | Status: ACTIVE | Noted: 2021-10-17

## 2022-03-24 PROBLEM — Z86.19 PERSONAL HISTORY OF OTHER INFECTIOUS AND PARASITIC DISEASES: Chronic | Status: ACTIVE | Noted: 2021-10-17

## 2022-03-24 PROBLEM — O14.90 UNSPECIFIED PRE-ECLAMPSIA, UNSPECIFIED TRIMESTER: Chronic | Status: ACTIVE | Noted: 2021-10-17

## 2022-03-24 PROBLEM — O03.9 COMPLETE OR UNSPECIFIED SPONTANEOUS ABORTION WITHOUT COMPLICATION: Chronic | Status: ACTIVE | Noted: 2021-10-17

## 2022-03-24 LAB
FLUAV AG NPH QL: DETECTED
FLUBV AG NPH QL: SIGNIFICANT CHANGE UP
RSV RNA NPH QL NAA+NON-PROBE: SIGNIFICANT CHANGE UP
SARS-COV-2 RNA SPEC QL NAA+PROBE: SIGNIFICANT CHANGE UP

## 2022-03-24 PROCEDURE — 87637 SARSCOV2&INF A&B&RSV AMP PRB: CPT

## 2022-03-24 PROCEDURE — 99284 EMERGENCY DEPT VISIT MOD MDM: CPT

## 2022-03-24 PROCEDURE — 99283 EMERGENCY DEPT VISIT LOW MDM: CPT

## 2022-03-24 RX ORDER — ONDANSETRON 8 MG/1
1 TABLET, FILM COATED ORAL
Qty: 21 | Refills: 0
Start: 2022-03-24 | End: 2022-03-30

## 2022-03-24 RX ORDER — ONDANSETRON 8 MG/1
4 TABLET, FILM COATED ORAL ONCE
Refills: 0 | Status: COMPLETED | OUTPATIENT
Start: 2022-03-24 | End: 2022-03-24

## 2022-03-24 RX ADMIN — ONDANSETRON 4 MILLIGRAM(S): 8 TABLET, FILM COATED ORAL at 10:48

## 2022-03-24 RX ADMIN — Medication 200 MILLIGRAM(S): at 10:48

## 2022-03-24 NOTE — ED ADULT NURSE NOTE - OBJECTIVE STATEMENT
Pt A&Ox4, NAD. Pt presents to the ED with complaints of fever, cough x3 days, Breathing even and unlabored.

## 2022-03-24 NOTE — ED PROVIDER NOTE - NSFOLLOWUPINSTRUCTIONS_ED_ALL_ED_FT
Acetaminophen 325 milligram pill, two pills every 6 hours  Drink plenty of fluids  wear a mask when in public so you don't infect others  expect to feel ill 1-2 weeks  Zofran one tab every 8 hours for nausea

## 2022-03-24 NOTE — ED PROVIDER NOTE - PROGRESS NOTE DETAILS
prior to eval mother yelling at someone over the phone   Mother asked 4 times to get off the phone in order to have the infant and herself examined  Mother left infant alone in room to go to bathroom without alerting staff to watch infant

## 2022-03-24 NOTE — ED PROVIDER NOTE - PHYSICAL EXAMINATION
w/n w/d female a and o x 3  heent neck supple mmm clear mucous from nose  cv rrr  pulm clear moving air A and P no retractions  abd soft nt nd no guarding or rebound  ext from  neuro clear speech and normal gait  skin warm dry  amble to ambulate with GREENBERG

## 2022-03-24 NOTE — ED PROVIDER NOTE - PATIENT PORTAL LINK FT
You can access the FollowMyHealth Patient Portal offered by [facility] by registering at the following website: [email or web address]. By joining [employer]’s FollowMyHealth portal, you will also be able to view your health information using other applications (apps) compatible with our system.

## 2022-03-24 NOTE — ED PROVIDER NOTE - OBJECTIVE STATEMENT
25 yo female with reported temperature to 104 and cough for three days  not vaccinated against covid or flu  No asthma or diabetes not immunocompromised  non smoker

## 2022-03-24 NOTE — ED ADULT NURSE NOTE - NSIMPLEMENTINTERV_GEN_ALL_ED
Implemented All Universal Safety Interventions:  [place] to call system. Call bell, personal items and telephone within reach. Instruct patient to call for assistance. Room bathroom lighting operational. Non-slip footwear when patient is off stretcher. Physically safe environment: no spills, clutter or unnecessary equipment. Stretcher in lowest position, wheels locked, appropriate side rails in place.

## 2022-03-24 NOTE — ED PROVIDER NOTE - NS ED ROS FT
Patient Seen in: 5 [facility]      History   Patient presents with:  Cough/URI    Stated Complaint: [facility]    The patient is a 60-year-old female with past history of hypertension, Performed by Jazmin Miranda MD at [address] [place] [address] ENDOSCOPY   • HERNIA SURGERY Left 12/21/2011    neuroma excision, partial mesh removal, primary re-repair   • INGUINAL HERNIA REPAIR Left 05/20/2009   • KNEE SURGERY Right     osteoarthritis   • OTHER Left 03/22/2 oriented ×3.   The patient's motor strength is 5 out of 5 and symmetric in the upper and lower extremities bilaterally  Extremities: No focal swelling or tenderness  Skin: No pallor, no redness or warmth to the touch      ED Course   Labs Reviewed - No data ros + fever + vomiting + cough  no diarrhea rash headache dizzy

## 2022-03-24 NOTE — ED ADULT TRIAGE NOTE - CHIEF COMPLAINT QUOTE
Pt arrives to ED c/o flue like symptoms: fever 104 .0  / cough  with yellow/green sputum and congestion - onset three days ago. Pt is not vaccinated

## 2022-09-26 NOTE — ED PROVIDER NOTE - IV ALTEPLASE INCLUSION HIDDEN
Problem: Knowledge Deficit  Goal: Verbalizes understanding of labor plan  Description: Assess patient/family/caregiver's baseline knowledge level and ability to understand information  Provide education via patient/family/caregiver's preferred learning method at appropriate level of understanding  1  Provide teaching at level of understanding  2  Provide teaching via preferred learning method(s)  9/26/2022 0756 by Thais Gurrola RN  Outcome: Progressing  9/26/2022 0756 by Thais Gurrola RN  Outcome: Progressing  Goal: Patient/family/caregiver demonstrates understanding of disease process, treatment plan, medications, and discharge instructions  Description: Complete learning assessment and assess knowledge base  Interventions:  - Provide teaching at level of understanding  - Provide teaching via preferred learning methods  Outcome: Progressing     Problem: Labor & Delivery  Goal: Manages discomfort  Description: Assess and monitor for signs and symptoms of discomfort  Assess patient's pain level regularly and per hospital policy  Administer medications as ordered  Support use of nonpharmacological methods to help control pain such as distraction, imagery, relaxation, and application of heat and cold  Collaborate with interdisciplinary team and patient to determine appropriate pain management plan  1  Include patient in decisions related to comfort  2  Offer non-pharmacological pain management interventions  3  Report ineffective pain management to physician   9/26/2022 0756 by Thais Gurrola RN  Outcome: Progressing  9/26/2022 0756 by Thais Gurrola RN  Outcome: Progressing  Goal: Patient vital signs are stable  Description: 1  Assess vital signs - vaginal delivery    9/26/2022 0756 by Thais Gurrola RN  Outcome: Progressing  9/26/2022 0756 by Thais Gurrola RN  Outcome: Progressing     Problem: PAIN - ADULT  Goal: Verbalizes/displays adequate comfort level or baseline comfort level  Description: Interventions:  - Encourage patient to monitor pain and request assistance  - Assess pain using appropriate pain scale  - Administer analgesics based on type and severity of pain and evaluate response  - Implement non-pharmacological measures as appropriate and evaluate response  - Consider cultural and social influences on pain and pain management  - Notify physician/advanced practitioner if interventions unsuccessful or patient reports new pain  Outcome: Progressing     Problem: INFECTION - ADULT  Goal: Absence or prevention of progression during hospitalization  Description: INTERVENTIONS:  - Assess and monitor for signs and symptoms of infection  - Monitor lab/diagnostic results  - Monitor all insertion sites, i e  indwelling lines, tubes, and drains  - Monitor endotracheal if appropriate and nasal secretions for changes in amount and color  - Nelson appropriate cooling/warming therapies per order  - Administer medications as ordered  - Instruct and encourage patient and family to use good hand hygiene technique  - Identify and instruct in appropriate isolation precautions for identified infection/condition  Outcome: Progressing  Goal: Absence of fever/infection during neutropenic period  Description: INTERVENTIONS:  - Monitor WBC    Outcome: Progressing     Problem: SAFETY ADULT  Goal: Patient will remain free of falls  Description: INTERVENTIONS:  - Educate patient/family on patient safety including physical limitations  - Instruct patient to call for assistance with activity   - Consult OT/PT to assist with strengthening/mobility   - Keep Call bell within reach  - Keep bed low and locked with side rails adjusted as appropriate  - Keep care items and personal belongings within reach  - Initiate and maintain comfort rounds  - Make Fall Risk Sign visible to staff  - Offer Toileting every  Hours, in advance of need  - Initiate/Maintain alarm  - Obtain necessary fall risk management equipment:   - Apply yellow socks and bracelet for high fall risk patients  - Consider moving patient to room near nurses station  Outcome: Progressing  Goal: Maintain or return to baseline ADL function  Description: INTERVENTIONS:  -  Assess patient's ability to carry out ADLs; assess patient's baseline for ADL function and identify physical deficits which impact ability to perform ADLs (bathing, care of mouth/teeth, toileting, grooming, dressing, etc )  - Assess/evaluate cause of self-care deficits   - Assess range of motion  - Assess patient's mobility; develop plan if impaired  - Assess patient's need for assistive devices and provide as appropriate  - Encourage maximum independence but intervene and supervise when necessary  - Involve family in performance of ADLs  - Assess for home care needs following discharge   - Consider OT consult to assist with ADL evaluation and planning for discharge  - Provide patient education as appropriate  Outcome: Progressing  Goal: Maintains/Returns to pre admission functional level  Description: INTERVENTIONS:  - Perform BMAT or MOVE assessment daily    - Set and communicate daily mobility goal to care team and patient/family/caregiver  - Collaborate with rehabilitation services on mobility goals if consulted  - Perform Range of Motion  times a day  - Reposition patient every  hours    - Dangle patient  times a day  - Stand patient  times a day  - Ambulate patient  times a day  - Out of bed to chair  times a day   - Out of bed for meals times a day  - Out of bed for toileting  - Record patient progress and toleration of activity level   Outcome: Progressing     Problem: DISCHARGE PLANNING  Goal: Discharge to home or other facility with appropriate resources  Description: INTERVENTIONS:  - Identify barriers to discharge w/patient and caregiver  - Arrange for needed discharge resources and transportation as appropriate  - Identify discharge learning needs (meds, wound care, etc )  - Arrange for interpretive services to assist at discharge as needed  - Refer to Case Management Department for coordinating discharge planning if the patient needs post-hospital services based on physician/advanced practitioner order or complex needs related to functional status, cognitive ability, or social support system  Outcome: Progressing show

## 2022-11-30 ENCOUNTER — TRANSCRIPTION ENCOUNTER (OUTPATIENT)
Age: 27
End: 2022-11-30

## 2023-03-14 ENCOUNTER — EMERGENCY (EMERGENCY)
Facility: HOSPITAL | Age: 28
LOS: 1 days | Discharge: HOME | End: 2023-03-14
Attending: EMERGENCY MEDICINE
Payer: COMMERCIAL

## 2023-03-14 VITALS
OXYGEN SATURATION: 99 % | SYSTOLIC BLOOD PRESSURE: 116 MMHG | RESPIRATION RATE: 18 BRPM | TEMPERATURE: 98 F | WEIGHT: 136.03 LBS | DIASTOLIC BLOOD PRESSURE: 79 MMHG | HEIGHT: 61 IN | HEART RATE: 88 BPM

## 2023-03-14 DIAGNOSIS — Z98.891 HISTORY OF UTERINE SCAR FROM PREVIOUS SURGERY: Chronic | ICD-10-CM

## 2023-03-14 LAB
ANION GAP SERPL CALC-SCNC: 12 MMOL/L — SIGNIFICANT CHANGE UP (ref 5–17)
APTT BLD: 28 SEC — SIGNIFICANT CHANGE UP (ref 27.5–35.5)
BASOPHILS # BLD AUTO: 0.06 K/UL — SIGNIFICANT CHANGE UP (ref 0–0.2)
BASOPHILS NFR BLD AUTO: 0.4 % — SIGNIFICANT CHANGE UP (ref 0–2)
BUN SERPL-MCNC: 11.6 MG/DL — SIGNIFICANT CHANGE UP (ref 8–20)
CALCIUM SERPL-MCNC: 9 MG/DL — SIGNIFICANT CHANGE UP (ref 8.4–10.5)
CHLORIDE SERPL-SCNC: 102 MMOL/L — SIGNIFICANT CHANGE UP (ref 96–108)
CO2 SERPL-SCNC: 24 MMOL/L — SIGNIFICANT CHANGE UP (ref 22–29)
CREAT SERPL-MCNC: 0.63 MG/DL — SIGNIFICANT CHANGE UP (ref 0.5–1.3)
EGFR: 125 ML/MIN/1.73M2 — SIGNIFICANT CHANGE UP
EOSINOPHIL # BLD AUTO: 0.14 K/UL — SIGNIFICANT CHANGE UP (ref 0–0.5)
EOSINOPHIL NFR BLD AUTO: 1 % — SIGNIFICANT CHANGE UP (ref 0–6)
FLUAV AG NPH QL: SIGNIFICANT CHANGE UP
FLUBV AG NPH QL: SIGNIFICANT CHANGE UP
GLUCOSE SERPL-MCNC: 112 MG/DL — HIGH (ref 70–99)
HCG SERPL-ACNC: <4 MIU/ML — SIGNIFICANT CHANGE UP
HCT VFR BLD CALC: 36.4 % — SIGNIFICANT CHANGE UP (ref 34.5–45)
HGB BLD-MCNC: 11.3 G/DL — LOW (ref 11.5–15.5)
IMM GRANULOCYTES NFR BLD AUTO: 0.5 % — SIGNIFICANT CHANGE UP (ref 0–0.9)
INR BLD: 0.97 RATIO — SIGNIFICANT CHANGE UP (ref 0.88–1.16)
LYMPHOCYTES # BLD AUTO: 17.8 % — SIGNIFICANT CHANGE UP (ref 13–44)
LYMPHOCYTES # BLD AUTO: 2.6 K/UL — SIGNIFICANT CHANGE UP (ref 1–3.3)
MCHC RBC-ENTMCNC: 23.9 PG — LOW (ref 27–34)
MCHC RBC-ENTMCNC: 31 GM/DL — LOW (ref 32–36)
MCV RBC AUTO: 77 FL — LOW (ref 80–100)
MONOCYTES # BLD AUTO: 0.93 K/UL — HIGH (ref 0–0.9)
MONOCYTES NFR BLD AUTO: 6.4 % — SIGNIFICANT CHANGE UP (ref 2–14)
NEUTROPHILS # BLD AUTO: 10.79 K/UL — HIGH (ref 1.8–7.4)
NEUTROPHILS NFR BLD AUTO: 73.9 % — SIGNIFICANT CHANGE UP (ref 43–77)
PLATELET # BLD AUTO: 304 K/UL — SIGNIFICANT CHANGE UP (ref 150–400)
POTASSIUM SERPL-MCNC: 3.4 MMOL/L — LOW (ref 3.5–5.3)
POTASSIUM SERPL-SCNC: 3.4 MMOL/L — LOW (ref 3.5–5.3)
PROTHROM AB SERPL-ACNC: 11.2 SEC — SIGNIFICANT CHANGE UP (ref 10.5–13.4)
RBC # BLD: 4.73 M/UL — SIGNIFICANT CHANGE UP (ref 3.8–5.2)
RBC # FLD: 19.9 % — HIGH (ref 10.3–14.5)
RSV RNA NPH QL NAA+NON-PROBE: SIGNIFICANT CHANGE UP
SARS-COV-2 RNA SPEC QL NAA+PROBE: SIGNIFICANT CHANGE UP
SODIUM SERPL-SCNC: 138 MMOL/L — SIGNIFICANT CHANGE UP (ref 135–145)
WBC # BLD: 14.59 K/UL — HIGH (ref 3.8–10.5)
WBC # FLD AUTO: 14.59 K/UL — HIGH (ref 3.8–10.5)

## 2023-03-14 PROCEDURE — 73564 X-RAY EXAM KNEE 4 OR MORE: CPT | Mod: 26,LT

## 2023-03-14 PROCEDURE — 73600 X-RAY EXAM OF ANKLE: CPT

## 2023-03-14 PROCEDURE — 85610 PROTHROMBIN TIME: CPT

## 2023-03-14 PROCEDURE — 73564 X-RAY EXAM KNEE 4 OR MORE: CPT

## 2023-03-14 PROCEDURE — 73590 X-RAY EXAM OF LOWER LEG: CPT

## 2023-03-14 PROCEDURE — 29515 APPLICATION SHORT LEG SPLINT: CPT | Mod: LT

## 2023-03-14 PROCEDURE — 87637 SARSCOV2&INF A&B&RSV AMP PRB: CPT

## 2023-03-14 PROCEDURE — 73620 X-RAY EXAM OF FOOT: CPT

## 2023-03-14 PROCEDURE — 99284 EMERGENCY DEPT VISIT MOD MDM: CPT | Mod: 25

## 2023-03-14 PROCEDURE — 85025 COMPLETE CBC W/AUTO DIFF WBC: CPT

## 2023-03-14 PROCEDURE — 73600 X-RAY EXAM OF ANKLE: CPT | Mod: 26,LT

## 2023-03-14 PROCEDURE — 96374 THER/PROPH/DIAG INJ IV PUSH: CPT | Mod: XU

## 2023-03-14 PROCEDURE — 80048 BASIC METABOLIC PNL TOTAL CA: CPT

## 2023-03-14 PROCEDURE — 36415 COLL VENOUS BLD VENIPUNCTURE: CPT

## 2023-03-14 PROCEDURE — 85730 THROMBOPLASTIN TIME PARTIAL: CPT

## 2023-03-14 PROCEDURE — 73590 X-RAY EXAM OF LOWER LEG: CPT | Mod: 26,LT

## 2023-03-14 PROCEDURE — 84702 CHORIONIC GONADOTROPIN TEST: CPT

## 2023-03-14 PROCEDURE — 73620 X-RAY EXAM OF FOOT: CPT | Mod: 26,LT

## 2023-03-14 RX ORDER — MORPHINE SULFATE 50 MG/1
4 CAPSULE, EXTENDED RELEASE ORAL ONCE
Refills: 0 | Status: DISCONTINUED | OUTPATIENT
Start: 2023-03-14 | End: 2023-03-14

## 2023-03-14 RX ADMIN — MORPHINE SULFATE 4 MILLIGRAM(S): 50 CAPSULE, EXTENDED RELEASE ORAL at 22:04
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
## 2023-03-14 NOTE — ED PROVIDER NOTE - CARE PROVIDER_API CALL
Tez Palomino (DO)  Orthopaedic Surgery  [address] [place] [address]  Phone: [phone]  Fax: [phone]  Follow Up Time: 1-3 Days
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
## 2023-03-14 NOTE — ED PROVIDER NOTE - PHYSICAL EXAMINATION
Constitutional: Uncomfortable appearing, awake, alert, oriented to person, place, and time/situation and in no apparent distress  ENMT: Airway patent nasal mucosa clear. Mouth with normal mucosa. Throat has no vesicles no oropharyngeal exudates and uvula is midline. No blood in the oropharynx  EYES: clear bilaterally, pupils equal, round and reactive to light  Cardiac: Regular rate, regular rhythm. Heart sounds S1, S2. No murmurs, rubs or gallops. Good capillary refill, 2+ pulses, no peripheral edema  Respiratory: Lungs CTAB, no use of accessory muscles, no crackles, satting 97% on RA in no distress  Gastrointestinal: Abdomen nondistended, non-tender, no rebound guarding or peritoneal signs  Genitourinary: No CVA tenderness, pelvis nontender, bladder nondistended  Musculoskeletal: Spine appears normal, range of motion is not limited, no muscle or joint tenderness. Full ROM at the hips, knees. L ankle ttp anteriorly and along tib/fib, no obvious deformities, sensation reflexes intact 2+ pulses capillary refill < 2 seconds   Neurological: Alert and oriented, no focal deficits, no motor or sensory deficits. CN 2-12 intact, PERRLA, EOMI, No FND, moving all extremities equally, sensation intact

## 2023-03-14 NOTE — ED PROVIDER NOTE - PATIENT PORTAL LINK FT
You can access the FollowMyHealth Patient Portal offered by [facility] by registering at the following website: [email or web address]. By joining [employer]’s FollowMyHealth portal, you will also be able to view your health information using other applications (apps) compatible with our system.

## 2023-03-14 NOTE — ED PROVIDER NOTE - PROGRESS NOTE DETAILS
JK - labs WNL. xrays without any fractures. [facility] consulted, no acute fracture identified. Patient with improving ROM, neurovascularly intact. Pain with ambulation and difficulty bearing weight; posterior splint placed. Crutches given. Ready for DC with return precautions. Currently stable.
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
## 2023-03-15 ENCOUNTER — EMERGENCY (EMERGENCY)
Facility: HOSPITAL | Age: 28
LOS: 1 days | Discharge: HOME | End: 2023-03-15
Attending: EMERGENCY MEDICINE
Payer: COMMERCIAL

## 2023-03-15 VITALS
HEIGHT: 61 IN | OXYGEN SATURATION: 100 % | RESPIRATION RATE: 16 BRPM | TEMPERATURE: 98 F | DIASTOLIC BLOOD PRESSURE: 60 MMHG | SYSTOLIC BLOOD PRESSURE: 106 MMHG | WEIGHT: 143.08 LBS | HEART RATE: 89 BPM

## 2023-03-15 DIAGNOSIS — Z98.891 HISTORY OF UTERINE SCAR FROM PREVIOUS SURGERY: Chronic | ICD-10-CM

## 2023-03-15 PROCEDURE — 73030 X-RAY EXAM OF SHOULDER: CPT | Mod: 26,RT

## 2023-03-15 PROCEDURE — 99283 EMERGENCY DEPT VISIT LOW MDM: CPT

## 2023-03-15 PROCEDURE — 73030 X-RAY EXAM OF SHOULDER: CPT

## 2023-03-15 PROCEDURE — 99284 EMERGENCY DEPT VISIT MOD MDM: CPT
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
## 2023-03-15 NOTE — ED PROVIDER NOTE - PATIENT PORTAL LINK FT
You can access the FollowMyHealth Patient Portal offered by [facility] by registering at the following website: [email or web address]. By joining [employer]’s FollowMyHealth portal, you will also be able to view your health information using other applications (apps) compatible with our system.

## 2023-03-18 ENCOUNTER — EMERGENCY (EMERGENCY)
Facility: HOSPITAL | Age: 28
LOS: 1 days | Discharge: HOME | End: 2023-03-18
Attending: EMERGENCY MEDICINE
Payer: COMMERCIAL

## 2023-03-18 VITALS
DIASTOLIC BLOOD PRESSURE: 85 MMHG | HEIGHT: 61 IN | HEART RATE: 81 BPM | SYSTOLIC BLOOD PRESSURE: 131 MMHG | RESPIRATION RATE: 22 BRPM | OXYGEN SATURATION: 98 %

## 2023-03-18 DIAGNOSIS — Z98.891 HISTORY OF UTERINE SCAR FROM PREVIOUS SURGERY: Chronic | ICD-10-CM

## 2023-03-18 LAB — HCG UR QL: NEGATIVE — SIGNIFICANT CHANGE UP

## 2023-03-18 PROCEDURE — 99284 EMERGENCY DEPT VISIT MOD MDM: CPT

## 2023-03-18 PROCEDURE — 99053 MED SERV 10PM-8AM 24 HR FAC: CPT

## 2023-03-18 RX ORDER — LIDOCAINE 4 G/100G
10 CREAM TOPICAL ONCE
Refills: 0 | Status: COMPLETED | OUTPATIENT
Start: 2023-03-18 | End: 2023-03-18

## 2023-03-18 RX ORDER — OXYCODONE AND ACETAMINOPHEN 5; 325 MG/1; MG/1
1 TABLET ORAL
Qty: 2 | Refills: 0
Start: 2023-03-18 | End: 2023-03-18

## 2023-03-18 RX ORDER — IBUPROFEN 200 MG
1 TABLET ORAL
Qty: 15 | Refills: 0
Start: 2023-03-18

## 2023-03-18 RX ORDER — BENZOCAINE 10 %
1 GEL (GRAM) MUCOUS MEMBRANE
Qty: 1 | Refills: 0
Start: 2023-03-18 | End: 2023-03-20

## 2023-03-18 RX ADMIN — LIDOCAINE 10 MILLILITER(S): 4 CREAM TOPICAL at 07:51

## 2023-03-18 RX ADMIN — Medication 1 TABLET(S): at 07:51
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
## 2023-03-18 NOTE — ED PROVIDER NOTE - ENMT, MLM
Airway patent, Nasal mucosa clear. Mouth with normal mucosa. Throat has no vesicles, no oropharyngeal exudates and uvula is midline.  left lower wisdom tooth with partial enamel area no visible pulp of the teeth nor bleeding- no signs of trauma to face - no raccoon eye or lai sign . able to open the mouth widely
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
## 2023-03-18 NOTE — ED PROVIDER NOTE - NS ED ATTENDING STATEMENT MOD
Patient expressed no known problems or needs This was a shared visit with the OCTAVIO. I reviewed and verified the documentation and independently performed the documented: Patient expressed no known problems or needs

## 2023-03-18 NOTE — ED PROVIDER NOTE - PATIENT PORTAL LINK FT
You can access the FollowMyHealth Patient Portal offered by [facility] by registering at the following website: [email or web address]. By joining [employer]’s FollowMyHealth portal, you will also be able to view your health information using other applications (apps) compatible with our system.

## 2023-03-18 NOTE — ED ADULT TRIAGE NOTE - CHIEF COMPLAINT QUOTE
c/o of severe pain on left side of mouth from a cracked tooth. Ibuprofen about 30 mins ago. Uanble to do temp. Pt report pain hurts too much to open mouth

## 2023-03-18 NOTE — ED PROVIDER NOTE - CONSTITUTIONAL, MLM
normal... Well appearing, awake, alert, oriented to person, place, time/ in some pain - poor historian

## 2023-03-18 NOTE — ED PROVIDER NOTE - NSFOLLOWUPINSTRUCTIONS_ED_ALL_ED_FT
please take medication as prescribed for the pain   call and follow up with dentist or [facility]   Tooth Injuries      Tooth injuries are injuries that happen because a strong force:  •Cracked a tooth.      •Moved a tooth out of place.      •Knocked a tooth out of the mouth.      A tooth injury needs to be treated quickly to save the tooth.      What are the causes?    This condition may be caused by:  •Anything that chips or breaks a tooth.      •Anything that moves a tooth out of its place or knocks it out of the mouth.      The injuries may come from accidents, falls, or fights.      What increases the risk?    •Playing sports without using a mouth guard. These sports include football or boxing.      •Medical conditions that cause falling or fainting.      •Anything that injures the face.      •Medical conditions that weaken the root of the tooth.        What are the signs or symptoms?    The main symptoms of this condition are:   •A tooth that has moved out of its place.      •A tooth that has moved into the gums or out of the gums.      •A tooth that you can no longer see because it is badly broken.      Other symptoms may include:  •Pain when chewing.      •A loose tooth.      •Bleeding in the tooth.      •Swelling or bruising of the tooth or lip.      •Pain in the tooth when you eat or drink something cold or hot.        How is this treated?    Treatment includes:  •Replacing a tooth fragment with a filling, a cap, or a hard cover (crown).      •Repairing the inside of the tooth (root canal).      •Putting the tooth back in its place, if it moved.      •Using a brace or splint to hold the tooth in place.      •Replacing the tooth that was knocked out of the mouth and then doing a root canal.      •Removing the tooth completely, if it cannot be saved.    •Taking medicine, including:   •Medicine for pain.      •Medicine to prevent infection.          Follow these instructions at home:    Medicines     •Take over-the-counter and prescription medicines only as told by your doctor.      •If you were prescribed an antibiotic medicine, take it as told by your doctor. Do not stop taking it even if you start to feel better.      • Do not drive or use heavy machines while taking prescription pain medicine.      Managing pain and swelling   •If told, put ice on your mouth near the injured tooth. To do this:  •Put ice in a plastic bag.      •Place a towel between your skin and the bag.      •Leave the ice on for 20 minutes, 2–3 times a day.      •Take off the ice if your skin turns bright red. This is very important. If you cannot feel pain, heat, or cold, you have a greater risk of damage to the area.        •Gargle with a salt-water mixture 3–4 times a day. To make this, dissolve ½–1 tsp of salt in 1 cup of warm water.        Caring for your teeth      • Do not eat or chew on very hard objects. These include ice cubes, pens, pencils, hard candy, and popcorn.      • Do not use your teeth to open packages.      • Do not clench or grind your teeth. Tell your doctor if you grind your teeth while you sleep.      •Brush your teeth gently as told by your doctor.      •Eat only soft foods as told by your doctor.      •Always wear a mouth guard when you play contact sports.      General instructions     • Do not use any products that contain nicotine or tobacco. These products include cigarettes, chewing tobacco, and vaping devices, such as e-cigarettes. These can delay incision healing after surgery. If you need help quitting, ask your health care provider.    •Check the injured area every day for signs of infection. Watch for:  •Redness, swelling, or pain.      •Fluid, blood, or pus.        •Keep all follow-up visits.        Contact a doctor if:    •You continue to have tooth pain after taking medicine.      •You have pus near the injured tooth.      •You develop swelling near your injured tooth.      •You have a tooth splint and it becomes loose.      •You have a loose tooth.        Get help right away if:    •Your face swells.      •You have a fever.      •You have bleeding near the tooth and it does not stop after 10 minutes.      •You have trouble swallowing.      •You are not able to open your mouth.      •Your tooth comes out.        Summary    •Tooth injuries are injuries that happen because something cracked the tooth or knocked it out of the mouth.      •Causes of this injury include accidents, falls, or fights.       •Treatment may need to be done quickly to save your tooth.      •Your doctor will tell you how to care for your injured tooth. He or she will tell you how to take medicines and how to check for infection.      •Call your doctor if there is bleeding or swelling near the injured tooth, or if you have a fever.      This information is not intended to replace advice given to you by your health care provider. Make sure you discuss any questions you have with your health care provider.

## 2023-03-19 PROCEDURE — 99283 EMERGENCY DEPT VISIT LOW MDM: CPT

## 2023-03-19 PROCEDURE — 81025 URINE PREGNANCY TEST: CPT

## 2023-08-15 NOTE — ED ADULT TRIAGE NOTE - CCCP TRG CHIEF CMPLNT
toothache/mouth pain Opzelura Counseling:  I discussed with the patient the risks of Opzelura including but not limited to nasopharngitis, bronchitis, ear infection, eosinophila, hives, diarrhea, folliculitis, tonsillitis, and rhinorrhea.  Taken orally, this medication has been linked to serious infections; higher rate of mortality; malignancy and lymphoproliferative disorders; major adverse cardiovascular events; thrombosis; thrombocytopenia, anemia, and neutropenia; and lipid elevations.

## 2023-10-03 NOTE — ED ADULT TRIAGE NOTE - TEMPERATURE IN CELSIUS (DEGREES C)
What is your communicable disease history:  · none     Do you know how to avoid getting:    · STD's: Yes:    · Hepatitis A, B, C: Yes    · COVID: Yes:    · TB: Yes:    · HIV: Yes:    · Flu: Yes:    Nursing supplied education to patient on the following topics:   · STD, COVID, TB, HIV and Flu     Education included:   · Hand washing    Patient accepted education: Yes     36.9

## 2023-11-11 ENCOUNTER — NON-APPOINTMENT (OUTPATIENT)
Age: 28
End: 2023-11-11

## 2024-01-07 ENCOUNTER — EMERGENCY (EMERGENCY)
Facility: HOSPITAL | Age: 29
LOS: 1 days | Discharge: HOME | End: 2024-01-07
Attending: EMERGENCY MEDICINE
Payer: COMMERCIAL

## 2024-01-07 VITALS
TEMPERATURE: 99 F | DIASTOLIC BLOOD PRESSURE: 78 MMHG | RESPIRATION RATE: 20 BRPM | SYSTOLIC BLOOD PRESSURE: 129 MMHG | HEART RATE: 110 BPM | WEIGHT: 175.71 LBS | OXYGEN SATURATION: 99 % | HEIGHT: 61 IN

## 2024-01-07 DIAGNOSIS — Z98.891 HISTORY OF UTERINE SCAR FROM PREVIOUS SURGERY: Chronic | ICD-10-CM

## 2024-01-07 LAB
HCG SERPL-ACNC: 9588 MIU/ML — HIGH
HCG SERPL-ACNC: 9588 MIU/ML — HIGH

## 2024-01-07 PROCEDURE — 84702 CHORIONIC GONADOTROPIN TEST: CPT

## 2024-01-07 PROCEDURE — 36415 COLL VENOUS BLD VENIPUNCTURE: CPT

## 2024-01-07 PROCEDURE — 76817 TRANSVAGINAL US OBSTETRIC: CPT | Mod: 26,59

## 2024-01-07 PROCEDURE — 76801 OB US < 14 WKS SINGLE FETUS: CPT | Mod: 26

## 2024-01-07 PROCEDURE — 99283 EMERGENCY DEPT VISIT LOW MDM: CPT

## 2024-01-07 PROCEDURE — 76801 OB US < 14 WKS SINGLE FETUS: CPT

## 2024-01-07 PROCEDURE — 76817 TRANSVAGINAL US OBSTETRIC: CPT

## 2024-01-07 PROCEDURE — 99284 EMERGENCY DEPT VISIT MOD MDM: CPT | Mod: 25

## 2024-01-07 NOTE — ED PROVIDER NOTE - PATIENT PORTAL LINK FT
You can access the FollowMyHealth Patient Portal offered by [facility] by registering at the following website: [email or web address]. By joining [employer]’s FollowMyHealth portal, you will also be able to view your health information using other applications (apps) compatible with our system. You can access the FollowMyHealth Patient Portal offered by [facility] by registering at the following website: [email or web address]. By joining "[employer]’s FollowMyHealth portal, you will also be able to view your health information using other applications (apps) compatible with our system.

## 2024-01-07 NOTE — ED PROVIDER NOTE - ATTENDING APP SHARED VISIT CONTRIBUTION OF CARE
Inez: I performed a face to face bedside interview with patient regarding history of present illness, review of symptoms and past medical history. I completed an independent physical exam.  I have discussed patient's plan of care with advanced care provider.   I agree with note as stated above including HISTORY OF PRESENT ILLNESS, HIV, PAST MEDICAL/SURGICAL/FAMILY/SOCIAL HISTORY, ALLERGIES AND HOME MEDICATIONS, REVIEW OF SYSTEMS, PHYSICAL EXAM, MEDICAL DECISION MAKING and any PROGRESS NOTES during the time I functioned as the attending physician for this patient  unless otherwise noted. My brief assessment is as follows:  at ~7 weeks pregnant present for eval after taking 2 pills for  [facility]. states hasn't had any bleeding so wanted a sono to see viability. has mild abd crmaping. no other symptoms. non toxic, ctab, rrr, abd benign. hcg, and sono.

## 2024-01-07 NOTE — ED PROVIDER NOTE - OBJECTIVE STATEMENT
29 y/o female approx 7 weeks pregnant presents to the ED for evaluation. Pt [facility] and [facility] for . Notes she had not been bleeding and wants to know if it is still a viable pregnancy, no abdominal pain or vaginal bleeding. 27 y/o female approx 7 weeks pregnant presents to the ED for evaluation. Pt notes [facility] and was prescribed medication for . Notes she had not been bleeding and wants to know if it is still a viable pregnancy, no abdominal pain or vaginal bleeding.

## 2024-01-07 NOTE — ED ADULT TRIAGE NOTE - HEIGHT IN FEET
5

## 2024-01-07 NOTE — ED ADULT TRIAGE NOTE - CHIEF COMPLAINT QUOTE
Pt A&Ox4, NAD. Pt sts [facility] . Pt sts she has not had any vaginal bleeding yet.

## 2024-01-16 NOTE — ED PROVIDER NOTE - SECONDARY DIAGNOSIS.
Urinary tract infection without hematuria, site unspecified
No pressure injuries as per flow sheets, however multiple stage II pressure injuries listed as per pt profile

## 2024-07-31 NOTE — ED ADULT NURSE NOTE - HOW OFTEN DO YOU HAVE SIX OR MORE DRINKS ON ONE OCCASION?
07/31/24 0800   OTHER   Discipline occupational therapist   Rehab Time/Intention   Session Not Performed other (see comments)  (CSF leak during surgery, bedrest orders until 7/31/24 1810, bed rest orders changed per nsg until 1300, will follow up)   Recommendation   OT - Next Appointment 08/01/24       
   07/31/24 1706   OTHER   Discipline physical therapist   Rehab Time/Intention   Session Not Performed   (on bed rest in AM and time did not allow return in PM. Will follow up for PT luiz 8/1.)   Recommendation   PT - Next Appointment 08/01/24       
Never

## 2024-10-22 ENCOUNTER — NON-APPOINTMENT (OUTPATIENT)
Age: 29
End: 2024-10-22

## 2024-12-05 NOTE — ED PROVIDER NOTE - PHYSICAL EXAMINATION
Head: atraumatic, normacephalic  Face: atraumatic, no crepitus no orbiral/maxillary/mandibular ttp  throat: uvula midline no exudates  eyes: perrla eomi  heart: rrr s1s2  lungs: ctab  abd: soft, nt nd +bs no rebound/guarding no cva ttp  skin: warm  LE: no swelling, no calf ttp  back: no midline cervical/thoracic/lumbar ttp  calm appearing Heterosexual

## 2024-12-13 NOTE — ED ADULT NURSE NOTE - OBJECTIVE STATEMENT
Zepbound ordered again as patient said that it is available in home*pharmacy  
Patient BIBA to ED after she called 911 due to feeling anxious.  Patient denies SI or HI.  pt also stated that is pregnant and with N/V

## 2025-01-29 NOTE — ED ADULT TRIAGE NOTE - SOURCE OF INFORMATION
Patient [Follow-Up] : a follow-up visit for [Mother] : mother [Syncope] : syncope [FreeTextEntry3] : CHF

## 2025-04-27 ENCOUNTER — NON-APPOINTMENT (OUTPATIENT)
Age: 30
End: 2025-04-27